# Patient Record
Sex: MALE | URBAN - METROPOLITAN AREA
[De-identification: names, ages, dates, MRNs, and addresses within clinical notes are randomized per-mention and may not be internally consistent; named-entity substitution may affect disease eponyms.]

---

## 2019-05-02 ENCOUNTER — HOSPITAL ENCOUNTER (OUTPATIENT)
Dept: RADIOLOGY | Facility: MEDICAL CENTER | Age: 49
End: 2019-05-02

## 2019-05-02 ENCOUNTER — HOSPITAL ENCOUNTER (INPATIENT)
Facility: MEDICAL CENTER | Age: 49
LOS: 4 days | DRG: 965 | End: 2019-05-06
Attending: EMERGENCY MEDICINE | Admitting: SURGERY
Payer: COMMERCIAL

## 2019-05-02 ENCOUNTER — APPOINTMENT (OUTPATIENT)
Dept: RADIOLOGY | Facility: MEDICAL CENTER | Age: 49
DRG: 965 | End: 2019-05-02
Attending: SURGERY
Payer: COMMERCIAL

## 2019-05-02 DIAGNOSIS — I62.9 INTRACRANIAL HEMORRHAGE (HCC): ICD-10-CM

## 2019-05-02 DIAGNOSIS — I61.9 INTRAPARENCHYMAL HEMORRHAGE OF BRAIN (HCC): ICD-10-CM

## 2019-05-02 DIAGNOSIS — T14.90XA TRAUMA: ICD-10-CM

## 2019-05-02 DIAGNOSIS — S32.509A CLOSED NONDISPLACED FRACTURE OF PUBIS, UNSPECIFIED LATERALITY, INITIAL ENCOUNTER (HCC): ICD-10-CM

## 2019-05-02 PROBLEM — T39.015A PLATELET DYSFUNCTION DUE TO ASPIRIN (HCC): Status: ACTIVE | Noted: 2019-05-02

## 2019-05-02 PROBLEM — D69.1 PLATELET DYSFUNCTION DUE TO ASPIRIN (HCC): Status: ACTIVE | Noted: 2019-05-02

## 2019-05-02 PROBLEM — S02.91XA SKULL FRACTURE (HCC): Status: ACTIVE | Noted: 2019-05-02

## 2019-05-02 PROBLEM — M25.572 ANKLE PAIN, LEFT: Status: ACTIVE | Noted: 2019-05-02

## 2019-05-02 PROBLEM — S32.599A PUBIC RAMUS FRACTURE (HCC): Status: ACTIVE | Noted: 2019-05-02

## 2019-05-02 PROBLEM — Z53.09 CONTRAINDICATION TO DEEP VEIN THROMBOSIS (DVT) PROPHYLAXIS: Status: ACTIVE | Noted: 2019-05-02

## 2019-05-02 PROBLEM — S01.81XA CHIN LACERATION: Status: ACTIVE | Noted: 2019-05-02

## 2019-05-02 LAB
ABO GROUP BLD: NORMAL
ALBUMIN SERPL BCP-MCNC: 3.8 G/DL (ref 3.2–4.9)
ALBUMIN/GLOB SERPL: 1.9 G/DL
ALP SERPL-CCNC: 46 U/L (ref 30–99)
ALT SERPL-CCNC: 31 U/L (ref 2–50)
ANION GAP SERPL CALC-SCNC: 14 MMOL/L (ref 0–11.9)
APTT PPP: 24.5 SEC (ref 24.7–36)
AST SERPL-CCNC: 46 U/L (ref 12–45)
BILIRUB SERPL-MCNC: 2 MG/DL (ref 0.1–1.5)
BLD GP AB SCN SERPL QL: NORMAL
BUN SERPL-MCNC: 14 MG/DL (ref 8–22)
CALCIUM SERPL-MCNC: 8.4 MG/DL (ref 8.5–10.5)
CFT BLD TEG: 4.2 MIN (ref 5–10)
CHLORIDE SERPL-SCNC: 105 MMOL/L (ref 96–112)
CLOT ANGLE BLD TEG: 65.2 DEGREES (ref 53–72)
CLOT LYSIS 30M P MA LENFR BLD TEG: 0 % (ref 0–8)
CO2 SERPL-SCNC: 19 MMOL/L (ref 20–33)
CREAT SERPL-MCNC: 0.83 MG/DL (ref 0.5–1.4)
CT.EXTRINSIC BLD ROTEM: 1.8 MIN (ref 1–3)
ERYTHROCYTE [DISTWIDTH] IN BLOOD BY AUTOMATED COUNT: 44.1 FL (ref 35.9–50)
ETHANOL BLD-MCNC: 0 G/DL
GLOBULIN SER CALC-MCNC: 2 G/DL (ref 1.9–3.5)
GLUCOSE SERPL-MCNC: 93 MG/DL (ref 65–99)
HCT VFR BLD AUTO: 43.2 % (ref 42–52)
HGB BLD-MCNC: 14.3 G/DL (ref 14–18)
INR PPP: 1.06 (ref 0.87–1.13)
MCF BLD TEG: 69.5 MM (ref 50–70)
MCH RBC QN AUTO: 30.4 PG (ref 27–33)
MCHC RBC AUTO-ENTMCNC: 33.1 G/DL (ref 33.7–35.3)
MCV RBC AUTO: 91.7 FL (ref 81.4–97.8)
PA AA BLD-ACNC: 44.3 %
PA ADP BLD-ACNC: 28.8 %
PLATELET # BLD AUTO: 223 K/UL (ref 164–446)
PMV BLD AUTO: 10.5 FL (ref 9–12.9)
POTASSIUM SERPL-SCNC: 4.4 MMOL/L (ref 3.6–5.5)
PROT SERPL-MCNC: 5.8 G/DL (ref 6–8.2)
PROTHROMBIN TIME: 13.9 SEC (ref 12–14.6)
RBC # BLD AUTO: 4.71 M/UL (ref 4.7–6.1)
RH BLD: NORMAL
SODIUM SERPL-SCNC: 138 MMOL/L (ref 135–145)
TEG ALGORITHM TGALG: ABNORMAL
WBC # BLD AUTO: 14.2 K/UL (ref 4.8–10.8)

## 2019-05-02 PROCEDURE — 80307 DRUG TEST PRSMV CHEM ANLYZR: CPT

## 2019-05-02 PROCEDURE — 70450 CT HEAD/BRAIN W/O DYE: CPT

## 2019-05-02 PROCEDURE — 86850 RBC ANTIBODY SCREEN: CPT

## 2019-05-02 PROCEDURE — 86901 BLOOD TYPING SEROLOGIC RH(D): CPT

## 2019-05-02 PROCEDURE — 85576 BLOOD PLATELET AGGREGATION: CPT | Mod: 91

## 2019-05-02 PROCEDURE — 73610 X-RAY EXAM OF ANKLE: CPT | Mod: LT

## 2019-05-02 PROCEDURE — 86900 BLOOD TYPING SEROLOGIC ABO: CPT

## 2019-05-02 PROCEDURE — 85027 COMPLETE CBC AUTOMATED: CPT

## 2019-05-02 PROCEDURE — 85384 FIBRINOGEN ACTIVITY: CPT

## 2019-05-02 PROCEDURE — 85730 THROMBOPLASTIN TIME PARTIAL: CPT

## 2019-05-02 PROCEDURE — 700101 HCHG RX REV CODE 250: Performed by: SURGERY

## 2019-05-02 PROCEDURE — 85610 PROTHROMBIN TIME: CPT

## 2019-05-02 PROCEDURE — 80053 COMPREHEN METABOLIC PANEL: CPT

## 2019-05-02 PROCEDURE — G0390 TRAUMA RESPONS W/HOSP CRITI: HCPCS

## 2019-05-02 PROCEDURE — 99291 CRITICAL CARE FIRST HOUR: CPT

## 2019-05-02 PROCEDURE — A9270 NON-COVERED ITEM OR SERVICE: HCPCS | Performed by: SURGERY

## 2019-05-02 PROCEDURE — 85347 COAGULATION TIME ACTIVATED: CPT

## 2019-05-02 PROCEDURE — 94760 N-INVAS EAR/PLS OXIMETRY 1: CPT

## 2019-05-02 PROCEDURE — 770022 HCHG ROOM/CARE - ICU (200)

## 2019-05-02 PROCEDURE — 700102 HCHG RX REV CODE 250 W/ 637 OVERRIDE(OP): Performed by: SURGERY

## 2019-05-02 PROCEDURE — 700105 HCHG RX REV CODE 258: Performed by: SURGERY

## 2019-05-02 RX ORDER — BACITRACIN ZINC AND POLYMYXIN B SULFATE 500; 1000 [USP'U]/G; [USP'U]/G
OINTMENT TOPICAL 3 TIMES DAILY
Status: DISCONTINUED | OUTPATIENT
Start: 2019-05-02 | End: 2019-05-06 | Stop reason: HOSPADM

## 2019-05-02 RX ORDER — POLYETHYLENE GLYCOL 3350 17 G/17G
1 POWDER, FOR SOLUTION ORAL 2 TIMES DAILY
Status: DISCONTINUED | OUTPATIENT
Start: 2019-05-02 | End: 2019-05-06 | Stop reason: HOSPADM

## 2019-05-02 RX ORDER — AMOXICILLIN 250 MG
1 CAPSULE ORAL NIGHTLY
Status: DISCONTINUED | OUTPATIENT
Start: 2019-05-02 | End: 2019-05-06 | Stop reason: HOSPADM

## 2019-05-02 RX ORDER — ONDANSETRON 2 MG/ML
4 INJECTION INTRAMUSCULAR; INTRAVENOUS EVERY 4 HOURS PRN
Status: DISCONTINUED | OUTPATIENT
Start: 2019-05-02 | End: 2019-05-06 | Stop reason: HOSPADM

## 2019-05-02 RX ORDER — AMOXICILLIN 250 MG
1 CAPSULE ORAL
Status: DISCONTINUED | OUTPATIENT
Start: 2019-05-02 | End: 2019-05-06 | Stop reason: HOSPADM

## 2019-05-02 RX ORDER — ENEMA 19; 7 G/133ML; G/133ML
1 ENEMA RECTAL
Status: DISCONTINUED | OUTPATIENT
Start: 2019-05-02 | End: 2019-05-06 | Stop reason: HOSPADM

## 2019-05-02 RX ORDER — ACETAMINOPHEN 500 MG
1000 TABLET ORAL EVERY 6 HOURS
Status: DISCONTINUED | OUTPATIENT
Start: 2019-05-02 | End: 2019-05-05

## 2019-05-02 RX ORDER — BISACODYL 10 MG
10 SUPPOSITORY, RECTAL RECTAL
Status: DISCONTINUED | OUTPATIENT
Start: 2019-05-02 | End: 2019-05-06 | Stop reason: HOSPADM

## 2019-05-02 RX ORDER — OXYCODONE HYDROCHLORIDE 5 MG/1
5 TABLET ORAL
Status: DISCONTINUED | OUTPATIENT
Start: 2019-05-02 | End: 2019-05-06 | Stop reason: HOSPADM

## 2019-05-02 RX ORDER — OXYCODONE HYDROCHLORIDE 10 MG/1
10 TABLET ORAL
Status: DISCONTINUED | OUTPATIENT
Start: 2019-05-02 | End: 2019-05-04

## 2019-05-02 RX ORDER — DOCUSATE SODIUM 100 MG/1
100 CAPSULE, LIQUID FILLED ORAL 2 TIMES DAILY
Status: DISCONTINUED | OUTPATIENT
Start: 2019-05-02 | End: 2019-05-06 | Stop reason: HOSPADM

## 2019-05-02 RX ORDER — SODIUM CHLORIDE 9 MG/ML
INJECTION, SOLUTION INTRAVENOUS CONTINUOUS
Status: DISCONTINUED | OUTPATIENT
Start: 2019-05-02 | End: 2019-05-03

## 2019-05-02 RX ORDER — LEVETIRACETAM 250 MG/1
500 TABLET ORAL 2 TIMES DAILY
Status: DISCONTINUED | OUTPATIENT
Start: 2019-05-02 | End: 2019-05-06 | Stop reason: HOSPADM

## 2019-05-02 RX ADMIN — ACETAMINOPHEN 1000 MG: 325 TABLET, FILM COATED ORAL at 20:27

## 2019-05-02 RX ADMIN — SODIUM CHLORIDE: 9 INJECTION, SOLUTION INTRAVENOUS at 20:02

## 2019-05-02 RX ADMIN — Medication 1 EACH: at 22:44

## 2019-05-02 RX ADMIN — LEVETIRACETAM 500 MG: 500 TABLET ORAL at 20:27

## 2019-05-02 ASSESSMENT — COGNITIVE AND FUNCTIONAL STATUS - GENERAL
WALKING IN HOSPITAL ROOM: A LITTLE
HELP NEEDED FOR BATHING: A LITTLE
CLIMB 3 TO 5 STEPS WITH RAILING: A LITTLE
MOBILITY SCORE: 20
SUGGESTED CMS G CODE MODIFIER DAILY ACTIVITY: CJ
SUGGESTED CMS G CODE MODIFIER MOBILITY: CJ
DRESSING REGULAR LOWER BODY CLOTHING: A LITTLE
MOVING FROM LYING ON BACK TO SITTING ON SIDE OF FLAT BED: A LITTLE
STANDING UP FROM CHAIR USING ARMS: A LITTLE
DAILY ACTIVITIY SCORE: 22

## 2019-05-02 ASSESSMENT — COPD QUESTIONNAIRES
COPD SCREENING SCORE: 2
HAVE YOU SMOKED AT LEAST 100 CIGARETTES IN YOUR ENTIRE LIFE: YES
COPD SCREENING SCORE: 3
DO YOU EVER COUGH UP ANY MUCUS OR PHLEGM?: NO/ONLY WITH OCCASIONAL COLDS OR INFECTIONS
IN THE PAST 12 MONTHS DO YOU DO LESS THAN YOU USED TO BECAUSE OF YOUR BREATHING PROBLEMS: DISAGREE/UNSURE
HAVE YOU SMOKED AT LEAST 100 CIGARETTES IN YOUR ENTIRE LIFE: YES
DURING THE PAST 4 WEEKS HOW MUCH DID YOU FEEL SHORT OF BREATH: SOME OF THE TIME
DO YOU EVER COUGH UP ANY MUCUS OR PHLEGM?: NO/ONLY WITH OCCASIONAL COLDS OR INFECTIONS
DURING THE PAST 4 WEEKS HOW MUCH DID YOU FEEL SHORT OF BREATH: NONE/LITTLE OF THE TIME

## 2019-05-02 ASSESSMENT — LIFESTYLE VARIABLES
EVER FELT BAD OR GUILTY ABOUT YOUR DRINKING: NO
TOTAL SCORE: 1
HAVE PEOPLE ANNOYED YOU BY CRITICIZING YOUR DRINKING: NO
CONSUMPTION TOTAL: POSITIVE
HAVE YOU EVER FELT YOU SHOULD CUT DOWN ON YOUR DRINKING: YES
EVER HAD A DRINK FIRST THING IN THE MORNING TO STEADY YOUR NERVES TO GET RID OF A HANGOVER: NO
TOTAL SCORE: 1
AVERAGE NUMBER OF DAYS PER WEEK YOU HAVE A DRINK CONTAINING ALCOHOL: 3
ALCOHOL_USE: YES
HOW MANY TIMES IN THE PAST YEAR HAVE YOU HAD 5 OR MORE DRINKS IN A DAY: 3
ON A TYPICAL DAY WHEN YOU DRINK ALCOHOL HOW MANY DRINKS DO YOU HAVE: 2
TOTAL SCORE: 1
EVER_SMOKED: YES
DO YOU DRINK ALCOHOL: NO
EVER_SMOKED: YES

## 2019-05-02 ASSESSMENT — PATIENT HEALTH QUESTIONNAIRE - PHQ9
SUM OF ALL RESPONSES TO PHQ9 QUESTIONS 1 AND 2: 0
1. LITTLE INTEREST OR PLEASURE IN DOING THINGS: NOT AT ALL
2. FEELING DOWN, DEPRESSED, IRRITABLE, OR HOPELESS: NOT AT ALL

## 2019-05-03 LAB
ABO + RH BLD: NORMAL
ALBUMIN SERPL BCP-MCNC: 3.1 G/DL (ref 3.2–4.9)
ALBUMIN/GLOB SERPL: 1.8 G/DL
ALP SERPL-CCNC: 37 U/L (ref 30–99)
ALT SERPL-CCNC: 28 U/L (ref 2–50)
ANION GAP SERPL CALC-SCNC: 5 MMOL/L (ref 0–11.9)
AST SERPL-CCNC: 46 U/L (ref 12–45)
BASOPHILS # BLD AUTO: 0.5 % (ref 0–1.8)
BASOPHILS # BLD: 0.05 K/UL (ref 0–0.12)
BILIRUB SERPL-MCNC: 2.8 MG/DL (ref 0.1–1.5)
BUN SERPL-MCNC: 11 MG/DL (ref 8–22)
CALCIUM SERPL-MCNC: 7.9 MG/DL (ref 8.5–10.5)
CHLORIDE SERPL-SCNC: 109 MMOL/L (ref 96–112)
CO2 SERPL-SCNC: 22 MMOL/L (ref 20–33)
CREAT SERPL-MCNC: 0.8 MG/DL (ref 0.5–1.4)
EOSINOPHIL # BLD AUTO: 0.03 K/UL (ref 0–0.51)
EOSINOPHIL NFR BLD: 0.3 % (ref 0–6.9)
ERYTHROCYTE [DISTWIDTH] IN BLOOD BY AUTOMATED COUNT: 45 FL (ref 35.9–50)
GLOBULIN SER CALC-MCNC: 1.7 G/DL (ref 1.9–3.5)
GLUCOSE SERPL-MCNC: 120 MG/DL (ref 65–99)
HCT VFR BLD AUTO: 37.2 % (ref 42–52)
HGB BLD-MCNC: 12.5 G/DL (ref 14–18)
IMM GRANULOCYTES # BLD AUTO: 0.04 K/UL (ref 0–0.11)
IMM GRANULOCYTES NFR BLD AUTO: 0.4 % (ref 0–0.9)
LYMPHOCYTES # BLD AUTO: 1.52 K/UL (ref 1–4.8)
LYMPHOCYTES NFR BLD: 16.3 % (ref 22–41)
MCH RBC QN AUTO: 31.2 PG (ref 27–33)
MCHC RBC AUTO-ENTMCNC: 33.6 G/DL (ref 33.7–35.3)
MCV RBC AUTO: 92.8 FL (ref 81.4–97.8)
MONOCYTES # BLD AUTO: 1 K/UL (ref 0–0.85)
MONOCYTES NFR BLD AUTO: 10.8 % (ref 0–13.4)
NEUTROPHILS # BLD AUTO: 6.66 K/UL (ref 1.82–7.42)
NEUTROPHILS NFR BLD: 71.7 % (ref 44–72)
NRBC # BLD AUTO: 0 K/UL
NRBC BLD-RTO: 0 /100 WBC
PLATELET # BLD AUTO: 196 K/UL (ref 164–446)
PMV BLD AUTO: 10.7 FL (ref 9–12.9)
POTASSIUM SERPL-SCNC: 3.8 MMOL/L (ref 3.6–5.5)
PROT SERPL-MCNC: 4.8 G/DL (ref 6–8.2)
RBC # BLD AUTO: 4.01 M/UL (ref 4.7–6.1)
SODIUM SERPL-SCNC: 136 MMOL/L (ref 135–145)
WBC # BLD AUTO: 9.3 K/UL (ref 4.8–10.8)

## 2019-05-03 PROCEDURE — 92523 SPEECH SOUND LANG COMPREHEN: CPT

## 2019-05-03 PROCEDURE — 700112 HCHG RX REV CODE 229: Performed by: SURGERY

## 2019-05-03 PROCEDURE — 700105 HCHG RX REV CODE 258: Performed by: NURSE PRACTITIONER

## 2019-05-03 PROCEDURE — A9270 NON-COVERED ITEM OR SERVICE: HCPCS | Performed by: SURGERY

## 2019-05-03 PROCEDURE — 85025 COMPLETE CBC W/AUTO DIFF WBC: CPT

## 2019-05-03 PROCEDURE — 80053 COMPREHEN METABOLIC PANEL: CPT

## 2019-05-03 PROCEDURE — 99233 SBSQ HOSP IP/OBS HIGH 50: CPT | Performed by: SURGERY

## 2019-05-03 PROCEDURE — 51798 US URINE CAPACITY MEASURE: CPT

## 2019-05-03 PROCEDURE — 700101 HCHG RX REV CODE 250: Performed by: SURGERY

## 2019-05-03 PROCEDURE — 700102 HCHG RX REV CODE 250 W/ 637 OVERRIDE(OP): Performed by: SURGERY

## 2019-05-03 PROCEDURE — 700105 HCHG RX REV CODE 258: Performed by: SURGERY

## 2019-05-03 PROCEDURE — 770001 HCHG ROOM/CARE - MED/SURG/GYN PRIV*

## 2019-05-03 RX ORDER — SODIUM CHLORIDE 9 MG/ML
INJECTION, SOLUTION INTRAVENOUS CONTINUOUS
Status: DISCONTINUED | OUTPATIENT
Start: 2019-05-03 | End: 2019-05-04

## 2019-05-03 RX ADMIN — SODIUM CHLORIDE: 9 INJECTION, SOLUTION INTRAVENOUS at 21:03

## 2019-05-03 RX ADMIN — Medication 1 EACH: at 05:41

## 2019-05-03 RX ADMIN — LEVETIRACETAM 500 MG: 500 TABLET ORAL at 17:01

## 2019-05-03 RX ADMIN — Medication 1 EACH: at 12:23

## 2019-05-03 RX ADMIN — ACETAMINOPHEN 1000 MG: 325 TABLET, FILM COATED ORAL at 02:31

## 2019-05-03 RX ADMIN — DOCUSATE SODIUM 100 MG: 100 CAPSULE, LIQUID FILLED ORAL at 17:01

## 2019-05-03 RX ADMIN — OXYCODONE HYDROCHLORIDE 10 MG: 10 TABLET ORAL at 05:37

## 2019-05-03 RX ADMIN — OXYCODONE HYDROCHLORIDE 10 MG: 10 TABLET ORAL at 02:31

## 2019-05-03 RX ADMIN — ACETAMINOPHEN 1000 MG: 325 TABLET, FILM COATED ORAL at 12:23

## 2019-05-03 RX ADMIN — Medication 1 EACH: at 17:01

## 2019-05-03 RX ADMIN — DOCUSATE SODIUM 100 MG: 100 CAPSULE, LIQUID FILLED ORAL at 05:37

## 2019-05-03 RX ADMIN — SENNOSIDES, DOCUSATE SODIUM 1 TABLET: 50; 8.6 TABLET, FILM COATED ORAL at 21:03

## 2019-05-03 RX ADMIN — LEVETIRACETAM 500 MG: 500 TABLET ORAL at 05:37

## 2019-05-03 RX ADMIN — ACETAMINOPHEN 1000 MG: 325 TABLET, FILM COATED ORAL at 06:16

## 2019-05-03 RX ADMIN — ACETAMINOPHEN 1000 MG: 325 TABLET, FILM COATED ORAL at 17:02

## 2019-05-03 RX ADMIN — SODIUM CHLORIDE: 9 INJECTION, SOLUTION INTRAVENOUS at 04:27

## 2019-05-03 ASSESSMENT — ENCOUNTER SYMPTOMS
VOMITING: 0
NAUSEA: 0
BLURRED VISION: 0
NECK PAIN: 0
SHORTNESS OF BREATH: 0
FOCAL WEAKNESS: 0
SENSORY CHANGE: 0
FEVER: 0
ABDOMINAL PAIN: 0
DIZZINESS: 0
CHILLS: 0
DOUBLE VISION: 0
MYALGIAS: 0
BACK PAIN: 0

## 2019-05-03 NOTE — PROGRESS NOTES
Dr. valenzuela paged regarding clarification for CT Head in the a.m. And okay to transfer out of unit.

## 2019-05-03 NOTE — CONSULTS
5/2/2019    Reason for consultation: Right pelvic ring injury    Consultation on Td Galarza at the request of Dr. Hurd for a right pelvic ring injury.  The patient is a 48 y.o. male who presents with a right pelvic ring injury due to fall from ladder today.  They were evaluated in the ER at [facility], and transferred to [facility] for head trauma, and Orthopedics was consulted. Patient denies numbness, paresthesias.  Unknown loss of consciousness, fall was unwitnessed.  He complains mostly of head/facial pain, denies much pain in his hip/pelvis except when turning.    No past medical history on file.    No past surgical history on file.    Medications  No current facility-administered medications on file prior to encounter.      No current outpatient prescriptions on file prior to encounter.       Allergies  Patient has no known allergies.    ROS  Per HPI. All other systems were reviewed and found to be negative    No family history on file.    Social History     Social History   • Marital status: N/A     Spouse name: N/A   • Number of children: N/A   • Years of education: N/A     Social History Main Topics   • Smoking status: Not on file   • Smokeless tobacco: Not on file   • Alcohol use Not on file   • Drug use: Unknown   • Sexual activity: Not on file     Other Topics Concern   • Not on file     Social History Narrative   • No narrative on file       Physical Exam  Vitals  /78   Pulse 89   Temp 37.3 °C (99.1 °F) (Temporal)   Resp 18   Ht 1.829 m (6')   Wt 81.6 kg (180 lb)   SpO2 96%   General: Well Developed, Well Nourished, no acute distress  Psychiatric: Alert and oriented x3, appropriate responses to questions, pleasant mood and affect.  HEENT: Hematoma about right eye, right sided facial swelling  Eyes: Anicteric, PERRL on left  Neck: Midline trachea, no pain with ROM  Chest: Symmetric expansion of the chest wall, non-tender to palpation, no distress.  Heart: RRR, palpable peripheral  pulses  Abdomen: Soft, NT, ND  Skin: Intact, no open wounds  Extremities: No pain with palpation of lower extremities or uppers, no deformities, no pain with log roll or hip ROM.  Neuro: Intact light touch sensation in feet, intact motors TA/GS/EHL/P bilaterally, no pain with palpation of upper extremities  Vascular: 2+ DP and radial pulses bilaterally, Capillary refill <2 seconds    Radiographs:  DX-ANKLE 3+ VIEWS LEFT   Final Result      1.  There is no displaced left ankle fracture.      OUTSIDE IMAGES-DX CHEST   Final Result      OUTSIDE IMAGES-CT HEAD   Final Result      OUTSIDE IMAGES-CT FACE   Final Result      OUTSIDE IMAGES-CT CERVICAL SPINE   Final Result      OUTSIDE IMAGES-CT CHEST/ABDOMEN/PELVIS   Final Result      OUTSIDE IMAGES-DX PELVIS   Final Result      CT-HEAD W/O    (Results Pending)       Laboratory Values  Recent Labs      05/02/19   1821   WBC  14.2*   RBC  4.71   HEMOGLOBIN  14.3   HEMATOCRIT  43.2   MCV  91.7   MCH  30.4   MCHC  33.1*   RDW  44.1   PLATELETCT  223   MPV  10.5     Recent Labs      05/02/19   1821   SODIUM  138   POTASSIUM  4.4   CHLORIDE  105   CO2  19*   GLUCOSE  93   BUN  14     Recent Labs      05/02/19   1821   APTT  24.5*   INR  1.06         Impression:    #1 Right LC 1 pelvic ring injury    Plan:    Non-operative management.  Patient may bear weight as tolerated, and should work with PT.  DVT prophylaxis is appropriate, and can be administered per trauma recs.  Follow up at [facility] or local orthopedic surgeon in 2 weeks.

## 2019-05-03 NOTE — PROGRESS NOTES
Trauma Progress Note 5/2/2019 10:10 PM    Briefly, this is a 48 y.o. male who was injured in a presumed, but unwitnessed fall from a ladder. He was seen at [facility] initially where he was found to have scattered frontal intraparenchymal hemorrhages and nondisplaced right superior ramus and acetabular fracture.            /78   Pulse 84   Temp 37.9 °C (100.3 °F) (Temporal)   Resp 15   Ht 1.829 m (6')   Wt 78.5 kg (173 lb 1 oz)   SpO2 97%   BMI 23.47 kg/m²     Hemoglobin: 14.3 g/dL  Hematocrit: 43.2 %    Elevated Anion gap: 14              Recent Labs      05/02/19   1821   APTT  24.5*   INR  1.06      Recent Labs      05/02/19   1821   REACTMIN  4.2*   CLOTKINET  1.8   CLOTANGL  65.2   MAXCLOTS  69.5   OHA00YLD  0.0   PRCINADP  28.8   PRCINAA  44.3         Urine Output: 850ml    Assessment: Remains somnolent but is orientated when awoken. Repeat head CT read as stable compared to outside Ct.       Additional plans: Cognitive evaluation in am. Q 2 hour neuro checks. Repeat labs in morning.

## 2019-05-03 NOTE — DISCHARGE PLANNING
Trauma Response    Referral: Trauma Yellow Transfer Response    Intervention: SW responded to trauma Yellow Transfer.  Pt was BIB [facility] after fall.  Pt was alert, oriented, talking upon arrival.  Pts name is Td Galarza (: 1970).  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  SW spoke with Pt he has his cell phone and he is keeping in touch with his family in the [place].    Plan: SW will monitor

## 2019-05-03 NOTE — CARE PLAN
Problem: Infection  Goal: Will remain free from infection    Intervention: Implement standard precautions and perform hand washing before and after patient contact  Standard precautions and hand hygiene implemented before and after each patient interaction       Problem: Venous Thromboembolism (VTW)/Deep Vein Thrombosis (DVT) Prevention:  Goal: Patient will participate in Venous Thrombosis (VTE)/Deep Vein Thrombosis (DVT)Prevention Measures    Intervention: Ensure patient wears graduated elastic stockings (HADLEY hose) and/or SCDs, if ordered, when in bed or chair (Remove at least once per shift for skin check)  SCDs will remain in place for extent of time patient remains in bed except when performing 2 RN skin check, bed bath, and/or ambulation

## 2019-05-03 NOTE — ASSESSMENT & PLAN NOTE
Referring facility CT head shows small multifocal areas of hemorrhage with a small cortical hemorrhage right frontal vertex and a 1cm deep white matter focus of hemorrhage int he right temporal region.   Repeat interval CT head with focal areas of hemorrhagic contusion in the right temporal and frontal lobes as described above, stable compared with the recent prior outside scan. Tiny acute extra-axial hemorrhage overlying the right frontal lobe, probably subdural, likely new.  Non-operative management.  Post traumatic pharmacologic seizure prophylaxis for 1 week.  Speech Language Pathology cognitive evaluation completed.  Renzo Gomez MD. Neurosurgery.

## 2019-05-03 NOTE — PROGRESS NOTES
2 RN skin check with ES Mcfadden    - sacrum intact, mepilex for preventative measures  - Right eye edema/ecchymosis  - Chin laceration, sutured PTA, open to air  - Left AC bruise  - Right posterior hip bruising  - Left ankle bruise  - Scattered abrasions throughout body

## 2019-05-03 NOTE — CARE PLAN
Problem: Safety  Goal: Will remain free from falls    Intervention: Implement fall precautions  Patient reminded to call RN before getting out of bed.  Call light within reach, bed in lowest position, treaded socks on patient and bed alarm activated.       Problem: Skin Integrity  Goal: Risk for impaired skin integrity will decrease    Intervention: Assess and monitor skin integrity, appearance and/or temperature  Complete skin assessment done with another RN.  See note for details.  Patient self turns, pillows in use for support.

## 2019-05-03 NOTE — ED NOTES
Med Rec Updated and Complete per Pt at bedside  Allergies Reviewed  No PO ABX last 30 days.    Pt denies RX medication at this time.    Pt reports he takes ASA 81 every now and then because it is recommended at his age.

## 2019-05-03 NOTE — DIETARY
Nutrition services: Day 1 of admit. 47 yo male admitted following a fall from ladder.  Consult for poor po intake and weight loss PTA.    Evaluation:  1. Pt states he has had about 5 kg weight loss over 1 months secondary to working hard with decreased nutritional intake.  2. He states he has no problems with his appetite and is eating well.   3. Diet advanced to regular today.     Malnutrition risk: moderate malnutrition with 5% weight loss over 1 month. Good appetite. Pt not concerned.     Recommendations/Plan:  1. encourage intake of meals.  2. document intake of all meals and supplements as % taken in ADL's to provide interdisciplinary communication across all shifts   3. monitor daily weights  4. Nutrition rep will continue to see patient for ongoing meal and snack preferences.

## 2019-05-03 NOTE — PROGRESS NOTES
Pt arrived to the unit on monitor at 1942 w/ ACLS RN and CCT. AO4, HANEY, no complaints of pain, all belongings at bedside. Pt oriented to the unit and call light. All questions and concerns addressed. No other questions at this time. Pt resting comfortably in bed.

## 2019-05-03 NOTE — ASSESSMENT & PLAN NOTE
Bilateral skull fractures in the lamina Propecia with overlying facial swelling and contusion.  Non-operative management.  Renzo Gomez MD. Neurosurgery.

## 2019-05-03 NOTE — ASSESSMENT & PLAN NOTE
Systemic anticoagulation contraindicated secondary to elevated bleeding risk.  Surveillance venous duplex scanning negative for DVT.

## 2019-05-03 NOTE — THERAPY
Speech Language Therapy Evaluation completed to address communication  Functional Status:  Cognitive-linguistic evaluation completed on this date.  Patient pleasant but with periods of lethargy. Patient originally from [place] [facility] but has lived and worked in the [place] and is fluent in English.  He completed the Cognistat in its entirety as well as other informal measures.  The Cognistat is a screening test that assesses the areas of language, construction, memory, calculations, and reasoning. He presented with a moderate-severe deficit in memory and a mild deficit in calculations.  All other domains assessed on the Cognistat were within average range.  However, the patient demonstrated mildly impaired higher level problem solving, medication management, reduced insight into deficits, and safety awareness.  While the patient achieved an average score of 8 in attention on the Cognistat, he required cues to attend to tasks as the session progressed and to maintain alertness.  The patient followed multi-step verbal directives but demonstrated delayed auditory processing.  He followed simple written directives, though, higher level written directives were less accurate which may be a language difference. The patient was noted to jump between sentences putting responses on the sentences below.  Writing was intact at the sentence level and was marked with missing articles, though, typical of a non-native English speaker.  He demonstrated mild word finding deficits and intermittent word substitutions which did not appear to be related to a language difference.  In the clock drawing task, the clock was disorganized with minimally displaced numbers and incorrect hand placement, despite cues.   Recommendations:  At this time, recommend close supervision upon discharge.  Given deficits, he does not appear to be safe to discharge alone and drive himself back to [facility].  The patient would benefit from outpatient  "SLP services to address stated deficits.  SLP following during the acute care.    Plan of Care: Will benefit from Speech Therapy 5 times per week  Post-Acute Therapy: Recommend outpatient or home health transitional care services for continued speech therapy services      See \"Rehab Therapy-Acute\" Patient Summary Report for complete documentation.                    "

## 2019-05-03 NOTE — PROGRESS NOTES
Dr Hurd at bedside to evaluate patient at approximately 2150. New orders received and implemented.

## 2019-05-03 NOTE — ASSESSMENT & PLAN NOTE
Tenderness over the left medial malleolus.  Diagnostic imaging on admission negative for fracture or dislocation.  Mobilize as tolerated.

## 2019-05-03 NOTE — H&P
TRAUMA HISTORY AND PHYSICAL    CHIEF COMPLAINT: Intracranial hemorrhage. Pelvic fracture.     HISTORY OF PRESENT ILLNESS: The patient is a 48 year-old White man who was injured in a presumed, but unwitnessed fall from a ladder.    TRIAGE CATEGORY: The patient was triaged as a Trauma Yellow Transfer activation. The patient was initially evaluated at [facility] in [place], [place] where CT imaging demonstrated Scattered frontal intraparenchymal hemorrhages and a nondisplaced right superior ramus and acetabular fracture. The patient was transported to [facility] in [place], [place] for a definitive neurotrauma evaluation. An expeditious primary and secondary survey with required adjuncts was conducted. See Trauma Narrator for full details.    PAST MEDICAL HISTORY:  has no past medical history on file.     PAST SURGICAL HISTORY:  has no past surgical history on file.    ALLERGIES: No Known Allergies    CURRENT MEDICATIONS:    Home Medications     Reviewed by Nacho Bowie (Pharmacy Tech) on 05/02/19 at 1842  Med List Status: Complete   Medication Last Dose Status   aspirin EC (ECOTRIN) 81 MG Tablet Delayed Response 5/1/2019 Active              FAMILY HISTORY: family history is not on file.    SOCIAL HISTORY:  Unknown.    REVIEW OF SYSTEMS: Comprehensive review of systems is not able to be elicited from the patient secondary to the acuity of the clinical situation    PHYSICAL EXAMINATION:     CONSTITUTIONAL:     Vital Signs: /78   Pulse 98   Temp 37.3 °C (99.1 °F) (Temporal)   Resp 18   Ht 1.829 m (6')   Wt 81.6 kg (180 lb)   SpO2 97%    General Appearance: appears stated age, is in mild distress.  HEENT:    Right facial and jaw swelling.  Small abrasion under the undersurface of the anterior mandible. The pupils are equal, round, and reactive to light bilaterally. The extraocular muscles are intact bilaterally. The ear canals and tympanic membranes are normal. The nares and  oropharynx are clear. The midface and jaw are stable. No malocclusion is evident.  NECK:    The cervical spine is supple and non tender. Normal range of motion.  RESPIRATORY:   Inspection: Unlabored respirations, no intercostal retractions, paradoxical motion, or accessory muscle use.   Palpation:  The chest is nontender. The clavicles are non deformed bilaterally.   Auscultation: clear to auscultation.  CARDIOVASCULAR:   Auscultation: regular rate and rhythm.   Peripheral Pulses: Normal.   ABDOMEN:   Abdomen is soft, nontender, without organomegaly or masses.  GENITOURINARY:   (MALE): normal male external genitalia.  MUSCULOSKELETAL:   The pelvis is tender anteriorly on the right. No significant angulation, deformity, or soft tissue injury involving the upper and lower extremities.  Tenderness over the left medial malleolus.  BACK:   The thoracolumbar spine was examined utilizing spinal motion restriction. Examination is remarkable for no significant tenderness, swelling, or deformity in the thoracolumbar region.  SKIN:    The skin is warm, dry and well purfused.  NEUROLOGIC:    [place] Coma Scale (GCS) 15. Neurologic examination revealed no focal deficits noted, cranial nerves II through XII intact, muscle tone normal, muscle strength normal, sensation is grossly normal, mildly confused and intermittently repetitive.  PSYCHIATRIC:   The patient does not appear depressed or anxious.    LABORATORY VALUES:   Recent Labs      05/02/19   1821   WBC  14.2*   RBC  4.71   HEMOGLOBIN  14.3   HEMATOCRIT  43.2   MCV  91.7   MCH  30.4   MCHC  33.1*   RDW  44.1   PLATELETCT  223   MPV  10.5     Recent Labs      05/02/19   1821   SODIUM  138   POTASSIUM  4.4   CHLORIDE  105   CO2  19*   GLUCOSE  93   BUN  14   CREATININE  0.83   CALCIUM  8.4*     Recent Labs      05/02/19   1821   ASTSGOT  46*   ALTSGPT  31   TBILIRUBIN  2.0*   ALKPHOSPHAT  46   GLOBULIN  2.0   INR  1.06     Recent Labs      05/02/19   1821   APTT  24.5*   INR   1.06        IMAGING:   DX-ANKLE 3+ VIEWS LEFT   Final Result      1.  There is no displaced left ankle fracture.      OUTSIDE IMAGES-DX CHEST   Final Result      OUTSIDE IMAGES-CT HEAD   Final Result      OUTSIDE IMAGES-CT FACE   Final Result      OUTSIDE IMAGES-CT CERVICAL SPINE   Final Result      OUTSIDE IMAGES-CT CHEST/ABDOMEN/PELVIS   Final Result      OUTSIDE IMAGES-DX PELVIS   Final Result          ACTIVE PROBLEMS:     Intracranial hemorrhage (HCC)  Referring facility CT head shows small multifocal areas of hemorrhage with a small cortical hemorrhage right frontal vertex and a 1cm deep white matter focus of hemorrhage int he right temporal region.   Repeat interval CT imaging of the brain.  Non-operative management.  Post traumatic pharmacologic seizure prophylaxis for 1 week.  Speech Language Pathology cognitive evaluation.  Renzo Gomez MD. Neurosurgery.    Platelet dysfunction due to aspirin ([facility])  Daily aspirin use.   TEG with platelet mapping pending.    Skull fracture ([facility])  Bilateral skull fractures in the lamina Propecia with overlying facial swelling and contusion.  Non-operative management.  Renzo Gomez MD. Neurosurgery.    Pubic ramus fracture ([facility])  Referring facility imaging notes a nondisplaced fracture at the junction of the right pubic ramus with the anterior column of the acetabulum and a nondisplaced fracture in the right sacral wing.  Definitive plan pending.  Weight bearing status - Nonweightbearing BLE.  Lul Javier MD. Orthopedic Surgery.    Trauma  Fall from [employer] ladder night prior to admission. Amnesic to event.  Trauma Yellow Transfer Activation from [facility].  Danis Hurd MD. Trauma Surgery.    Contraindication to deep vein thrombosis (DVT) prophylaxis  Systemic anticoagulation contraindicated secondary to elevated bleeding risk.  5/2 Surveillance venous duplex scanning ordered for 5/4.    Chin laceration  Sutured repair of right chin laceration at the  referring facility.    Ankle pain, left  Tenderness over the left medial malleolus.  Diagnostic imaging on admission negative for fracture or dislocation.  Mobilize as tolerated.      ASSESSMENT AND PLAN:  Multisystem trauma.  Bifrontal intracranial contusions.  Pelvic fracture.  ICU admission for serial neurologic checks.  Repeat imaging.  Neurosurgical consultation.  Orthopedic surgery consultation.  Tertiary survey.    DISPOSITION: Trauma ICU.    CRITICAL CARE TIME: 34 minutes excluding procedures.  ____________________________________   Danis Hurd M.D.    DD: 5/2/2019  6:18 PM

## 2019-05-03 NOTE — PROGRESS NOTES
Trauma / Surgical Daily Progress Note    Date of Service  5/3/2019    Chief Complaint  48 y.o. male admitted 5/2/2019 with Trauma    Interval Events  New admit to ICU  Neurosurgical and orthopedic recommendations reviewed  RAP / SBIRT completed    - Advance diet  - Therapy evaluations pending  - Clinically stable to transfer to neurosurgery [facility] at this time, Dr. Hurd updated    Review of Systems  Review of Systems   Constitutional: Negative for chills and fever.   Eyes: Negative for blurred vision and double vision.   Respiratory: Negative for shortness of breath.    Cardiovascular: Negative for chest pain.   Gastrointestinal: Negative for abdominal pain, nausea and vomiting.   Genitourinary:        Voiding   Musculoskeletal: Positive for joint pain (mild pelivc pain). Negative for back pain, myalgias and neck pain.   Neurological: Negative for dizziness, sensory change and focal weakness.        Vital Signs  Temp:  [36.7 °C (98.1 °F)-37.9 °C (100.3 °F)] 36.8 °C (98.3 °F)  Pulse:  [68-98] 70  Resp:  [13-31] 18  BP: (134-141)/(78-84) 134/78  SpO2:  [92 %-99 %] 92 %    Physical Exam  Physical Exam   Constitutional: He is oriented to person, place, and time. He appears well-developed. No distress.   Eyes: Right conjunctiva has a hemorrhage (mild).   Right periorbital ecchymosis and edema   Neck: No JVD present. No tracheal deviation present.   Cardiovascular: Normal rate and intact distal pulses.    Pulmonary/Chest: Effort normal. No respiratory distress. He exhibits no tenderness.   Abdominal: Soft. He exhibits no distension. There is no tenderness. There is no guarding.   Musculoskeletal:   Moves all extremities   Neurological: He is alert and oriented to person, place, and time.   Skin: Skin is warm and dry.   Nursing note and vitals reviewed.      Laboratory  Recent Results (from the past 24 hour(s))   DIAGNOSTIC ALCOHOL    Collection Time: 05/02/19  6:21 PM   Result Value Ref Range    Diagnostic Alcohol 0.00 0.00  g/dL   CBC WITHOUT DIFFERENTIAL    Collection Time: 05/02/19  6:21 PM   Result Value Ref Range    WBC 14.2 (H) 4.8 - 10.8 K/uL    RBC 4.71 4.70 - 6.10 M/uL    Hemoglobin 14.3 14.0 - 18.0 g/dL    Hematocrit 43.2 42.0 - 52.0 %    MCV 91.7 81.4 - 97.8 fL    MCH 30.4 27.0 - 33.0 pg    MCHC 33.1 (L) 33.7 - 35.3 g/dL    RDW 44.1 35.9 - 50.0 fL    Platelet Count 223 164 - 446 K/uL    MPV 10.5 9.0 - 12.9 fL   Comp Metabolic Panel    Collection Time: 05/02/19  6:21 PM   Result Value Ref Range    Sodium 138 135 - 145 mmol/L    Potassium 4.4 3.6 - 5.5 mmol/L    Chloride 105 96 - 112 mmol/L    Co2 19 (L) 20 - 33 mmol/L    Anion Gap 14.0 (H) 0.0 - 11.9    Glucose 93 65 - 99 mg/dL    Bun 14 8 - 22 mg/dL    Creatinine 0.83 0.50 - 1.40 mg/dL    Calcium 8.4 (L) 8.5 - 10.5 mg/dL    AST(SGOT) 46 (H) 12 - 45 U/L    ALT(SGPT) 31 2 - 50 U/L    Alkaline Phosphatase 46 30 - 99 U/L    Total Bilirubin 2.0 (H) 0.1 - 1.5 mg/dL    Albumin 3.8 3.2 - 4.9 g/dL    Total Protein 5.8 (L) 6.0 - 8.2 g/dL    Globulin 2.0 1.9 - 3.5 g/dL    A-G Ratio 1.9 g/dL   Prothrombin Time    Collection Time: 05/02/19  6:21 PM   Result Value Ref Range    PT 13.9 12.0 - 14.6 sec    INR 1.06 0.87 - 1.13   APTT    Collection Time: 05/02/19  6:21 PM   Result Value Ref Range    APTT 24.5 (L) 24.7 - 36.0 sec   PLATELET MAPPING WITH BASIC TEG    Collection Time: 05/02/19  6:21 PM   Result Value Ref Range    Reaction Time Initial-R 4.2 (L) 5.0 - 10.0 min    Clot Kinetics-K 1.8 1.0 - 3.0 min    Clot Angle-Angle 65.2 53.0 - 72.0 degrees    Maximum Clot Strength-MA 69.5 50.0 - 70.0 mm    Lysis 30 minutes-LY30 0.0 0.0 - 8.0 %    % Inhibition ADP 28.8 %    % Inhibition AA 44.3 %    TEG Algorithm Link Algorithm    COD - Adult (Type and Screen)    Collection Time: 05/02/19  6:21 PM   Result Value Ref Range    ABO Grouping Only B     Rh Grouping Only POS     Antibody Screen-Cod NEG    ESTIMATED GFR    Collection Time: 05/02/19  6:21 PM   Result Value Ref Range    GFR If   American >60 >60 mL/min/1.73 m 2    GFR If Non African American >60 >60 mL/min/1.73 m 2   CBC with Differential: Tomorrow AM    Collection Time: 05/03/19  4:30 AM   Result Value Ref Range    WBC 9.3 4.8 - 10.8 K/uL    RBC 4.01 (L) 4.70 - 6.10 M/uL    Hemoglobin 12.5 (L) 14.0 - 18.0 g/dL    Hematocrit 37.2 (L) 42.0 - 52.0 %    MCV 92.8 81.4 - 97.8 fL    MCH 31.2 27.0 - 33.0 pg    MCHC 33.6 (L) 33.7 - 35.3 g/dL    RDW 45.0 35.9 - 50.0 fL    Platelet Count 196 164 - 446 K/uL    MPV 10.7 9.0 - 12.9 fL    Neutrophils-Polys 71.70 44.00 - 72.00 %    Lymphocytes 16.30 (L) 22.00 - 41.00 %    Monocytes 10.80 0.00 - 13.40 %    Eosinophils 0.30 0.00 - 6.90 %    Basophils 0.50 0.00 - 1.80 %    Immature Granulocytes 0.40 0.00 - 0.90 %    Nucleated RBC 0.00 /100 WBC    Neutrophils (Absolute) 6.66 1.82 - 7.42 K/uL    Lymphs (Absolute) 1.52 1.00 - 4.80 K/uL    Monos (Absolute) 1.00 (H) 0.00 - 0.85 K/uL    Eos (Absolute) 0.03 0.00 - 0.51 K/uL    Baso (Absolute) 0.05 0.00 - 0.12 K/uL    Immature Granulocytes (abs) 0.04 0.00 - 0.11 K/uL    NRBC (Absolute) 0.00 K/uL   Comp Metabolic Panel (CMP): Tomorrow AM    Collection Time: 05/03/19  4:30 AM   Result Value Ref Range    Sodium 136 135 - 145 mmol/L    Potassium 3.8 3.6 - 5.5 mmol/L    Chloride 109 96 - 112 mmol/L    Co2 22 20 - 33 mmol/L    Anion Gap 5.0 0.0 - 11.9    Glucose 120 (H) 65 - 99 mg/dL    Bun 11 8 - 22 mg/dL    Creatinine 0.80 0.50 - 1.40 mg/dL    Calcium 7.9 (L) 8.5 - 10.5 mg/dL    AST(SGOT) 46 (H) 12 - 45 U/L    ALT(SGPT) 28 2 - 50 U/L    Alkaline Phosphatase 37 30 - 99 U/L    Total Bilirubin 2.8 (H) 0.1 - 1.5 mg/dL    Albumin 3.1 (L) 3.2 - 4.9 g/dL    Total Protein 4.8 (L) 6.0 - 8.2 g/dL    Globulin 1.7 (L) 1.9 - 3.5 g/dL    A-G Ratio 1.8 g/dL   ESTIMATED GFR    Collection Time: 05/03/19  4:30 AM   Result Value Ref Range    GFR If African American >60 >60 mL/min/1.73 m 2    GFR If Non African American >60 >60 mL/min/1.73 m 2       Fluids    Intake/Output Summary (Last 24  hours) at 05/03/19 0959  Last data filed at 05/03/19 0600   Gross per 24 hour   Intake          2125.83 ml   Output             1200 ml   Net           925.83 ml       Core Measures & Quality Metrics  Labs reviewed, Medications reviewed and Radiology images reviewed  Fontaine catheter: No Fontaine      DVT Prophylaxis: Contraindicated - High bleeding risk  DVT prophylaxis - mechanical: SCDs          Total Score: 9    ETOH Screening  CAGE Score: 1  Assessment complete date: 5/3/2019        Assessment/Plan  Pubic ramus fracture (HCC)- (present on admission)   Assessment & Plan    Referring facility imaging notes a nondisplaced fracture at the junction of the right pubic ramus with the anterior column of the acetabulum and a nondisplaced fracture in the right sacral wing.  Non-operative management.  Weight bearing status - Weightbearing as tolerated BLE.  Follow up in 2 weeks  Lul Javier MD. Orthopedic Surgery.     Intracranial hemorrhage (HCC)- (present on admission)   Assessment & Plan    Referring facility CT head shows small multifocal areas of hemorrhage with a small cortical hemorrhage right frontal vertex and a 1cm deep white matter focus of hemorrhage int he right temporal region.   Repeat interval CT head with focal areas of hemorrhagic contusion in the right temporal and frontal lobes as described above, stable compared with the recent prior outside scan. Tiny acute extra-axial hemorrhage overlying the right frontal lobe, probably subdural, likely new.  Non-operative management.  Post traumatic pharmacologic seizure prophylaxis for 1 week.  Speech Language Pathology cognitive evaluation.  Renzo Gomez MD. Neurosurgery.     Ankle pain, left- (present on admission)   Assessment & Plan    Tenderness over the left medial malleolus.  Diagnostic imaging on admission negative for fracture or dislocation.  Mobilize as tolerated.      Contraindication to deep vein thrombosis (DVT) prophylaxis- (present on admission)    Assessment & Plan    Systemic anticoagulation contraindicated secondary to elevated bleeding risk.  Surveillance venous duplex scanning ordered for 5/4.     Skull fracture (HCC)- (present on admission)   Assessment & Plan    Bilateral skull fractures in the lamina Propecia with overlying facial swelling and contusion.  Non-operative management.  Renzo Gomez MD. Neurosurgery.      Platelet dysfunction due to aspirin (HCC)- (present on admission)   Assessment & Plan    Daily aspirin use.   Subthreshold platelet inhibition on admission TEG.      Chin laceration- (present on admission)   Assessment & Plan    Sutured repair of right chin laceration at the referring facility.     Trauma- (present on admission)   Assessment & Plan    Fall from 10ft ladder night prior to admission. Amnesic to event.  Trauma Yellow Transfer Activation from [facility].  Danis Hurd MD. Trauma Surgery.          Discussed patient condition with RN, Patient and trauma surgery, Dr. Perez.

## 2019-05-03 NOTE — ED NOTES
48 yoM. Fell off ladder yesterday, on ASA. Transfer from [facility]. R frontal and R temporal intraparenchymal bleeds, nondisplaced R pelvic fracture.

## 2019-05-03 NOTE — PROGRESS NOTES
"INITIAL TRAUMA TERTIARY SURVEY PROGRESS NOTE       INTERVAL EVENTS:  Admitted to ICU    UPDATED HISTORY:  Past Medical History:  has no past medical history on file.   Reviewed: Yes.    Past Surgical History:  has a past surgical history that includes other (11/2016).  Reviewed: Yes.    Allergies: No Known Allergies  Reviewed: Yes.    Family History: family history is not on file., denies significant family history  Reviewed: Yes.    Social History:  reports that he quit smoking about 2 years ago. His smoking use included Cigarettes. He has a 15.00 pack-year smoking history. He has never used smokeless tobacco. He reports that he drinks alcohol. He reports that he uses drugs, including Oral.  Reviewed: Yes    Home Medication Reconciliation:  Home Medications     Reviewed by Erick Patiño R.N. (Registered Nurse) on 05/02/19 at 2250  Med List Status: Complete   Medication Last Dose Status   aspirin EC (ECOTRIN) 81 MG Tablet Delayed Response 5/1/2019 Active              Reviewed: Yes.    PHYSICAL EXAMINATION:  Vitals: /78   Pulse 70   Temp 36.8 °C (98.3 °F) (Temporal)   Resp 18   Ht 1.828 m (5' 11.97\")   Wt 78.5 kg (173 lb 1 oz)   SpO2 92%   BMI 23.49 kg/m²   Constitutional:     General Appearance: appears stated age, is in no apparent distress.  HEENT:    Right periorbital ecchomisis and edema.. The pupils are equal, round, and reactive to light bilaterally. The extraocular muscles are intact bilaterally. The nares and oropharynx are clear. The midface and jaw are stable. No malocclusion is evident.  Neck:    The cervical spine is supple and non tender. Normal range of motion.  Respiratory:   Inspection: Unlabored respirations, no intercostal retractions, paradoxical motion, or accessory muscle use.   Palpation:  The chest is nontender. The clavicles are non deformed bilaterally.   Auscultation: normal, clear to auscultation.  Cardiovascular:   Auscultation: normal and regular rate and " rhythm.   Peripheral Pulses: Normal.   Abdomen:   Abdomen is soft, nontender, without organomegaly or masses.  Musculoskeletal:   The pelvis is stable.  No significant angulation, deformity, or soft tissue injury involving the upper and lower extremities. Normal range of motion.   Back:   The thoracolumbar spine was examined. Examination is remarkable for no significant tenderness, swelling, or deformity in the thoracolumbar region.  Skin:   The skin is warm and dry.  Neurologic:    David Coma Scale (GCS) 15. Neurologic examination revealed no focal deficits noted, mental status intact.  Psychiatric:   The patient does not appear depressed or anxious.    IMAGING:  CT-HEAD W/O   Final Result      1.  Focal areas of hemorrhagic contusion in the right temporal and frontal lobes as described above, stable compared with the recent prior outside scan.   2.  Tiny acute extra-axial hemorrhage overlying the right frontal lobe, probably subdural, likely new.   3.  As noted above, there is no mass effect or midline shift.   4.  Not mentioned above, there is a rounded hyperdense structure in the right maxillary region, just inferior to the zygomatic arch, measuring about 2.5 cm diameter, probably an acute hematoma.               INTERPRETING LOCATION:  [address] [place] [address], [facility]      DX-ANKLE 3+ VIEWS LEFT   Final Result      1.  There is no displaced left ankle fracture.      OUTSIDE IMAGES-DX CHEST   Final Result      OUTSIDE IMAGES-CT HEAD   Final Result      OUTSIDE IMAGES-CT FACE   Final Result      OUTSIDE IMAGES-CT CERVICAL SPINE   Final Result      OUTSIDE IMAGES-CT CHEST/ABDOMEN/PELVIS   Final Result      OUTSIDE IMAGES-DX PELVIS   Final Result        All current laboratory studies/radiology exams reviewed: Yes    ASSESSMENT AND PLAN:  Active Problems:  Intracranial hemorrhage (HCC)  Referring facility CT head shows small multifocal areas of hemorrhage with a small cortical hemorrhage right frontal vertex and a 1cm  deep white matter focus of hemorrhage int he right temporal region.   Repeat interval CT head with focal areas of hemorrhagic contusion in the right temporal and frontal lobes as described above, stable compared with the recent prior outside scan. Tiny acute extra-axial hemorrhage overlying the right frontal lobe, probably subdural, likely new.  Non-operative management.  Post traumatic pharmacologic seizure prophylaxis for 1 week.  Speech Language Pathology cognitive evaluation.  Renzo Gomez MD. Neurosurgery.    Platelet dysfunction due to aspirin ([facility])  Daily aspirin use.   Subthreshold platelet inhibition on admission TEG.     Skull fracture ([facility])  Bilateral skull fractures in the lamina Propecia with overlying facial swelling and contusion.  Non-operative management.  Renzo Gomez MD. Neurosurgery.     Pubic ramus fracture ([facility])  Referring facility imaging notes a nondisplaced fracture at the junction of the right pubic ramus with the anterior column of the acetabulum and a nondisplaced fracture in the right sacral wing.  Non-operative management.  Weight bearing status - Weightbearing as tolerated BLE.  Follow up in 2 weeks  Lul Javier MD. Orthopedic Surgery.    Trauma  Fall from [employer] ladder night prior to admission. Amnesic to event.  Trauma Yellow Transfer Activation from [facility].  Danis Hurd MD. Trauma Surgery.     Contraindication to deep vein thrombosis (DVT) prophylaxis  Systemic anticoagulation contraindicated secondary to elevated bleeding risk.  Surveillance venous duplex scanning ordered for 5/4.    Chin laceration  Sutured repair of right chin laceration at the referring facility.    Ankle pain, left  Tenderness over the left medial malleolus.  Diagnostic imaging on admission negative for fracture or dislocation.  Mobilize as tolerated.       Pending Consults:  None     Tertiary survey completed (mental status adequate for full examination): No further findings    Spine  cleared (radiologically and clinically): Yes

## 2019-05-03 NOTE — ED NOTES
"Assumed care of pt. Report received.     Pt unable to recall event. When asked if he fell off of the ladder last night he states, \"I think so\". His friend found him this afternoon and took him to [facility] ER.   Pt with significant RT sided facial swelling/perioribtal swelling/ecchymosis. no fx identified PTA. He does have some stiches to his chin, intact at this time.  He has RT sided pelvic fractures with a fracture at the junction of the RT pubic ramus and a nondisplaced fx of the RT sacral wing.     PERRLA. A&Ox4.   Needs repeat head CT this evening.     tdap updated PTA.   "

## 2019-05-03 NOTE — PROGRESS NOTES
Trauma Yellow  Ortho Team Paged [phone]  Arrived 1824  Pelvic Fracture  D/W Trauma, who will admit  Formal consult to follow

## 2019-05-03 NOTE — PROGRESS NOTES
Dr. valenzuela replied via tiger text.  Patient okay for transfer and no CT needed this morning.  MALINI Vazquez, updated.

## 2019-05-03 NOTE — PROGRESS NOTES
2 RN skin check complete    Ecchymosis, abrasion and edema noted to R eye    Abrasion to R cheek    Laceration w/ stitches under chin    Bruising noted to LUE inner AC area    Bruising noted to R posterior hip area    Bruising/abrasions noted to bilateral feet and ankles    Sacral mepilex in place    All pictures taken    No other areas of concern at this time.

## 2019-05-03 NOTE — CONSULTS
Neurosurgical consultation    52/2019 1945  Referring MD:  Dr. Virk, [facility] ED  Reason for referral:  Cerebral contusion     CHIEF COMPLAINT  headache.        HPI  Td Galarza is a 48 y.o. male who was climbing a ladder last night and fell approximately 10 feet, he does not recall the event, he did not realize he fell until he saw the blood on the bottom of the ladder, crawled in the bed, had a hard time waking up this morning.    REVIEW OF SYSTEMS  See HPI for further details. All other systems are negative.       PAST MEDICAL HISTORY  Past Medical History   No past medical history on file.        FAMILY HISTORY  Family History   No family history on file.        SOCIAL HISTORY  Social History               Social History   • Marital status: N/A       Spouse name: N/A   • Number of children: N/A   • Years of education: N/A           Social History Main Topics   • Smoking status: Not on file   • Smokeless tobacco: Not on file   • Alcohol use Not on file   • Drug use: Unknown   • Sexual activity: Not on file           Other Topics Concern   • Not on file          Social History Narrative   • No narrative on file            SURGICAL HISTORY  Past Surgical History   No past surgical history on file.        CURRENT MEDICATIONS  Home Medications    **Home medications have not yet been reviewed for this encounter**            ALLERGIES  No Known Allergies     PHYSICAL EXAM  VITAL SIGNS: /78   Pulse 83   Temp 37.3 °C (99.1 °F) (Temporal)   Resp (!) 22   Ht 1.829 m (6')   Wt 81.6 kg (180 lb)   SpO2 92%   BMI 24.41 kg/m²   Constitutional: Well developed well-nourished 48-year-old male in mild to moderate distress  HENT: Patient with a large amount of soft tissue swelling, ecchymosis throughout the right side of his maxillary and facial area that extends down to the chin,  No otorrhea no rhinorrhea  Eyes: PERRLA, EOMI, Conjunctiva normal, No discharge.   Neck:  no midline C-spine tenderness palpationSkin:  Warm, Dry, No erythema, No rash.   Musculoskeletal: full range of motion  Neurologic:   Awake, alert   Speech fluent appropriate  In no apparent distress  Affect, mood appropriate  Oriented x 3  Pupils 3 mm midline, reactive.  Conjugate gaze.  Visual fields full to confrontation  Face symmetric  Tongue midline without fasciculation  Facial sensation intact light touch  Hearing intact to conversation, light finger rub bilaterally  Motor:  Bilateral SCM, shoulder shrug, deltoid, bicep, tricep, , wrist extension, hand intrinsics                IP, thigh adduction, thigh abduction, quadriceps, hamstrings, dorsiflexion,                Plantar flexion, foot inversion, foot eversion, EHL 5/5 no pronator drift  Sensation:  Intact touch face, neck, torso, four extremities  Reflex:  No Rowley's no clonus  Finger-nose-finger, ANGELO unremarkable      RADIOLOGY/PROCEDURES  Head CT outside facility 5/2/2019:  7 mm right temporal 2 mm right high frontal hemorrhagic contusions; basal cisterns patent  HISTORY/REASON FOR EXAM:  And trauma follow-up.      TECHNIQUE/EXAM DESCRIPTION: CT scan of the head without contrast, 5/2/2019 9:06 PM.    Contiguous 5 mm axial sections were obtained from the skull base through the vertex.    Up to date radiation dose reduction adjustments have been utilized to meet ALARA standards for radiation dose reduction.    COMPARISON:  Prior scan today from an outside facility, 1546 hours    FINDINGS:   The ventricular system and cortical sulci are normal.  There is no midline shift or other mass effect. Again noted is an approximate 1 cm diameter focal intra-axial hemorrhage in the right temporal lobe with subtle smaller areas of adjacent   hemorrhage. This appearance is grossly unchanged from the prior scan. Additionally, there is a tiny approximately 3 mm hemorrhage in the high right frontal region, also unchanged. There is a tiny extra-axial hemorrhage overlying the right frontal lobe   measuring about  1 to 2 mm thickness, which is probably new. The calvaria are intact. The visualized paranasal sinuses show no unusual opacity.   Impression       1.  Focal areas of hemorrhagic contusion in the right temporal and frontal lobes as described above, stable compared with the recent prior outside scan.  2.  Tiny acute extra-axial hemorrhage overlying the right frontal lobe, probably subdural, likely new.  3.  As noted above, there is no mass effect or midline shift.  4.  Not mentioned above, there is a rounded hyperdense structure in the right maxillary region, just inferior to the zygomatic arch, measuring about 2.5 cm diameter, probably an acute hematoma.       AP:  48 year old male with two small right contusions with no significant mass effect.  Recommend observation.  Follow up CT in am, earlier if neurologic deterioration.

## 2019-05-03 NOTE — ED PROVIDER NOTES
ED Provider Note    CHIEF COMPLAINT  No chief complaint on file.      HPI  Td Galarza is a 48 y.o. male who presents to the ED as a trauma yellow transfer.  Apparently the patient was climbing a ladder last night fell approximately 10 feet, does not recall the event, he did not realize he fell until he saw the blood on the bottom of the ladder, crawled in the bed, had a hard time waking up this morning went to the emergency department.  In the emergency room the patient had a large amount of swelling and contusion to the right side of his face, had a CT scan of the head that shows 2 small intraparenchymal hemorrhages, multiple CTs were done including CT scan of the face, neck, chest abdomen pelvis which show a nondisplaced pelvic fracture.  Patient states he has mild tenderness palpation throughout the face, he does not have any chest pain, he does not have any shortness breath, abdominal pains, nausea vomiting    REVIEW OF SYSTEMS  See HPI for further details. All other systems are negative.      PAST MEDICAL HISTORY  No past medical history on file.    FAMILY HISTORY  No family history on file.    SOCIAL HISTORY  Social History     Social History   • Marital status: N/A     Spouse name: N/A   • Number of children: N/A   • Years of education: N/A     Social History Main Topics   • Smoking status: Not on file   • Smokeless tobacco: Not on file   • Alcohol use Not on file   • Drug use: Unknown   • Sexual activity: Not on file     Other Topics Concern   • Not on file     Social History Narrative   • No narrative on file       SURGICAL HISTORY  No past surgical history on file.    CURRENT MEDICATIONS  Home Medications    **Home medications have not yet been reviewed for this encounter**         ALLERGIES  No Known Allergies    PHYSICAL EXAM  VITAL SIGNS: /78   Pulse 83   Temp 37.3 °C (99.1 °F) (Temporal)   Resp (!) 22   Ht 1.829 m (6')   Wt 81.6 kg (180 lb)   SpO2 92%   BMI 24.41 kg/m²   Constitutional:  Well developed well-nourished 48-year-old male in mild to moderate distress  HENT: Patient with a large amount of soft tissue swelling, ecchymosis throughout the right side of his maxillary and facial area that extends down to the chin, he does have some tenderness palpation throughout the maxilla, mandible area, TMs are clear  Eyes: PERRLA, EOMI, Conjunctiva normal, No discharge.   Neck: Trachea is midline, no midline C-spine tenderness palpation  Cardiovascular: Normal heart rate, Normal rhythm   Thorax & Lungs: Normal breath sounds, No respiratory distress, No wheezing, No chest tenderness.   Abdomen: Bowel sounds normal, Soft, No tenderness, No masses, No pulsatile masses.   Skin: Warm, Dry, No erythema, No rash.   Back: No T or L-spine tenderness palpation  Extremities: Intact distal pulses, No edema, No tenderness.   Musculoskeletal: No gross deformities pelvis is nontender  Neurologic: Awake alert slightly confused, good muscle strength throughout    RADIOLOGY/PROCEDURES  DX-ANKLE 3+ VIEWS LEFT   Final Result      1.  There is no displaced left ankle fracture.      OUTSIDE IMAGES-DX CHEST   Final Result      OUTSIDE IMAGES-CT HEAD   Final Result      OUTSIDE IMAGES-CT FACE   Final Result      OUTSIDE IMAGES-CT CERVICAL SPINE   Final Result      OUTSIDE IMAGES-CT CHEST/ABDOMEN/PELVIS   Final Result      OUTSIDE IMAGES-DX PELVIS   Final Result            COURSE & MEDICAL DECISION MAKING  Pertinent Labs & Imaging studies reviewed. (See chart for details)  Patient is transferred from outside facility secondary to intraparenchymal hemorrhages along with a nondisplaced pelvic fracture, the patient is awake and alert but still slightly confused.  Dr. finn is at the bedside upon the patient's arrival, he will admit the patient to the ICU on consult neurosurgery.        FINAL IMPRESSION  1. Intraparenchymal hemorrhage of brain (HCC)    2. Closed nondisplaced fracture of pubis, unspecified laterality, initial encounter  ([facility])          This dictation was created using voice recognition software. The accuracy of the dictation is limited to the abilities of the software. I expect there may be some errors of grammar and possibly content. The nursing notes were reviewed and certain aspects of this information were incorporated into this note.    Electronically signed by: Asim Virk, 5/2/2019

## 2019-05-03 NOTE — ASSESSMENT & PLAN NOTE
Referring facility imaging notes a nondisplaced fracture at the junction of the right pubic ramus with the anterior column of the acetabulum and a nondisplaced fracture in the right sacral wing.  Non-operative management.  Weight bearing status - Weightbearing as tolerated BLE.  Follow up in 2 weeks  Lul Javier MD. Orthopedic Surgery.

## 2019-05-03 NOTE — PROGRESS NOTES
Dr. Javier at bedside with Ortho to assess patient at 2005. Pt updated on plan of care. All questions and concerns addressed.

## 2019-05-03 NOTE — ASSESSMENT & PLAN NOTE
Sutured repair of right chin laceration at the referring facility.  Remove sutures in 5 days (5/7)

## 2019-05-03 NOTE — ASSESSMENT & PLAN NOTE
Fall from 10ft ladder night prior to admission. Amnesic to event.  Trauma Yellow Transfer Activation from [facility].  Danis Hurd MD. Trauma Surgery.

## 2019-05-04 PROBLEM — T39.015A PLATELET DYSFUNCTION DUE TO ASPIRIN (HCC): Status: RESOLVED | Noted: 2019-05-02 | Resolved: 2019-05-04

## 2019-05-04 PROBLEM — D69.1 PLATELET DYSFUNCTION DUE TO ASPIRIN (HCC): Status: RESOLVED | Noted: 2019-05-02 | Resolved: 2019-05-04

## 2019-05-04 PROBLEM — M25.572 ANKLE PAIN, LEFT: Status: RESOLVED | Noted: 2019-05-02 | Resolved: 2019-05-04

## 2019-05-04 LAB
ANION GAP SERPL CALC-SCNC: 7 MMOL/L (ref 0–11.9)
BASOPHILS # BLD AUTO: 0.5 % (ref 0–1.8)
BASOPHILS # BLD: 0.04 K/UL (ref 0–0.12)
BUN SERPL-MCNC: 9 MG/DL (ref 8–22)
CALCIUM SERPL-MCNC: 8.2 MG/DL (ref 8.5–10.5)
CHLORIDE SERPL-SCNC: 108 MMOL/L (ref 96–112)
CO2 SERPL-SCNC: 25 MMOL/L (ref 20–33)
CREAT SERPL-MCNC: 0.83 MG/DL (ref 0.5–1.4)
EOSINOPHIL # BLD AUTO: 0.08 K/UL (ref 0–0.51)
EOSINOPHIL NFR BLD: 1 % (ref 0–6.9)
ERYTHROCYTE [DISTWIDTH] IN BLOOD BY AUTOMATED COUNT: 43.8 FL (ref 35.9–50)
GLUCOSE SERPL-MCNC: 108 MG/DL (ref 65–99)
HCT VFR BLD AUTO: 38.3 % (ref 42–52)
HGB BLD-MCNC: 12.9 G/DL (ref 14–18)
IMM GRANULOCYTES # BLD AUTO: 0.03 K/UL (ref 0–0.11)
IMM GRANULOCYTES NFR BLD AUTO: 0.4 % (ref 0–0.9)
LYMPHOCYTES # BLD AUTO: 1.37 K/UL (ref 1–4.8)
LYMPHOCYTES NFR BLD: 17.8 % (ref 22–41)
MCH RBC QN AUTO: 30.7 PG (ref 27–33)
MCHC RBC AUTO-ENTMCNC: 33.7 G/DL (ref 33.7–35.3)
MCV RBC AUTO: 91.2 FL (ref 81.4–97.8)
MONOCYTES # BLD AUTO: 0.7 K/UL (ref 0–0.85)
MONOCYTES NFR BLD AUTO: 9.1 % (ref 0–13.4)
NEUTROPHILS # BLD AUTO: 5.46 K/UL (ref 1.82–7.42)
NEUTROPHILS NFR BLD: 71.2 % (ref 44–72)
NRBC # BLD AUTO: 0 K/UL
NRBC BLD-RTO: 0 /100 WBC
PLATELET # BLD AUTO: 181 K/UL (ref 164–446)
PMV BLD AUTO: 10.9 FL (ref 9–12.9)
POTASSIUM SERPL-SCNC: 3.8 MMOL/L (ref 3.6–5.5)
RBC # BLD AUTO: 4.2 M/UL (ref 4.7–6.1)
SODIUM SERPL-SCNC: 140 MMOL/L (ref 135–145)
WBC # BLD AUTO: 7.7 K/UL (ref 4.8–10.8)

## 2019-05-04 PROCEDURE — 700102 HCHG RX REV CODE 250 W/ 637 OVERRIDE(OP): Performed by: SURGERY

## 2019-05-04 PROCEDURE — 700101 HCHG RX REV CODE 250: Performed by: SURGERY

## 2019-05-04 PROCEDURE — 80048 BASIC METABOLIC PNL TOTAL CA: CPT

## 2019-05-04 PROCEDURE — 700112 HCHG RX REV CODE 229: Performed by: SURGERY

## 2019-05-04 PROCEDURE — 85025 COMPLETE CBC W/AUTO DIFF WBC: CPT

## 2019-05-04 PROCEDURE — 36415 COLL VENOUS BLD VENIPUNCTURE: CPT

## 2019-05-04 PROCEDURE — A9270 NON-COVERED ITEM OR SERVICE: HCPCS | Performed by: SURGERY

## 2019-05-04 PROCEDURE — 770001 HCHG ROOM/CARE - MED/SURG/GYN PRIV*

## 2019-05-04 PROCEDURE — 97162 PT EVAL MOD COMPLEX 30 MIN: CPT

## 2019-05-04 RX ADMIN — POLYETHYLENE GLYCOL 3350 1 PACKET: 17 POWDER, FOR SOLUTION ORAL at 05:55

## 2019-05-04 RX ADMIN — ACETAMINOPHEN 1000 MG: 325 TABLET, FILM COATED ORAL at 17:01

## 2019-05-04 RX ADMIN — DOCUSATE SODIUM 100 MG: 100 CAPSULE, LIQUID FILLED ORAL at 05:56

## 2019-05-04 RX ADMIN — POLYETHYLENE GLYCOL 3350 1 PACKET: 17 POWDER, FOR SOLUTION ORAL at 17:02

## 2019-05-04 RX ADMIN — ACETAMINOPHEN 1000 MG: 325 TABLET, FILM COATED ORAL at 22:05

## 2019-05-04 RX ADMIN — ACETAMINOPHEN 1000 MG: 325 TABLET, FILM COATED ORAL at 05:55

## 2019-05-04 RX ADMIN — LEVETIRACETAM 500 MG: 500 TABLET ORAL at 17:01

## 2019-05-04 RX ADMIN — DOCUSATE SODIUM 100 MG: 100 CAPSULE, LIQUID FILLED ORAL at 17:01

## 2019-05-04 RX ADMIN — Medication 1 EACH: at 12:28

## 2019-05-04 RX ADMIN — OXYCODONE HYDROCHLORIDE 5 MG: 5 TABLET ORAL at 02:41

## 2019-05-04 RX ADMIN — Medication 1 EACH: at 17:02

## 2019-05-04 RX ADMIN — ACETAMINOPHEN 1000 MG: 325 TABLET, FILM COATED ORAL at 12:28

## 2019-05-04 RX ADMIN — Medication 1 EACH: at 05:56

## 2019-05-04 RX ADMIN — OXYCODONE HYDROCHLORIDE 5 MG: 5 TABLET ORAL at 23:05

## 2019-05-04 RX ADMIN — SENNOSIDES, DOCUSATE SODIUM 1 TABLET: 50; 8.6 TABLET, FILM COATED ORAL at 22:05

## 2019-05-04 RX ADMIN — LEVETIRACETAM 500 MG: 500 TABLET ORAL at 05:56

## 2019-05-04 ASSESSMENT — ENCOUNTER SYMPTOMS
MYALGIAS: 0
SENSORY CHANGE: 0
DIZZINESS: 0
FEVER: 0
CHILLS: 0
FOCAL WEAKNESS: 0
NECK PAIN: 0
VOMITING: 0
NAUSEA: 0
DOUBLE VISION: 0
SHORTNESS OF BREATH: 0
ABDOMINAL PAIN: 0
BLURRED VISION: 0
BACK PAIN: 0

## 2019-05-04 ASSESSMENT — COGNITIVE AND FUNCTIONAL STATUS - GENERAL
CLIMB 3 TO 5 STEPS WITH RAILING: A LITTLE
STANDING UP FROM CHAIR USING ARMS: A LITTLE
WALKING IN HOSPITAL ROOM: A LITTLE
SUGGESTED CMS G CODE MODIFIER MOBILITY: CK
MOVING FROM LYING ON BACK TO SITTING ON SIDE OF FLAT BED: A LITTLE
MOVING TO AND FROM BED TO CHAIR: UNABLE
MOBILITY SCORE: 17

## 2019-05-04 ASSESSMENT — GAIT ASSESSMENTS
DEVIATION: BRADYKINETIC;DECREASED BASE OF SUPPORT
GAIT LEVEL OF ASSIST: MINIMAL ASSIST
DISTANCE (FEET): 150

## 2019-05-04 NOTE — PROGRESS NOTES
Pt arrived to floor via transport, pt oriented to room and call light system, bed alarm on, bed locked and in lowest position. Hourly rounding in place.

## 2019-05-04 NOTE — CARE PLAN
Problem: Communication  Goal: The ability to communicate needs accurately and effectively will improve  Outcome: PROGRESSING AS EXPECTED      Problem: Pain Management  Goal: Pain level will decrease to patient's comfort goal  Outcome: PROGRESSING AS EXPECTED      Problem: Urinary Elimination:  Goal: Ability to reestablish a normal urinary elimination pattern will improve  Outcome: PROGRESSING AS EXPECTED  Patient voiding adequately throughout shift. No complaints of bladder fullness

## 2019-05-04 NOTE — PROGRESS NOTES
Neurosurgery Progress Note    Subjective:  No acute events. Pt sleeping, rouses easily to voice. Just had pain medication, headache improved.     Exam:  Drowsy. Oriented x3. Fluent Speech.   3 PERRL, EOMI. Denies blurry or double vision.   Tongue midline without fasciculation. No facial droop.   Ecchymosis around right eye  Hearing intact.   HANEY with f/s  Sensation: Intact throughout.   Eating, drinking. Denies n/v.  Pain controlled.   Ambulatory.     BP  Min: 123/80  Max: 125/86  Pulse  Av.6  Min: 49  Max: 88  Resp  Av.3  Min: 14  Max: 40  Temp  Av.9 °C (98.4 °F)  Min: 36.6 °C (97.8 °F)  Max: 37.7 °C (99.9 °F)  SpO2  Av %  Min: 93 %  Max: 97 %    No Data Recorded    Recent Labs      19   WBC  14.2*  9.3  7.7   RBC  4.71  4.01*  4.20*   HEMOGLOBIN  14.3  12.5*  12.9*   HEMATOCRIT  43.2  37.2*  38.3*   MCV  91.7  92.8  91.2   MCH  30.4  31.2  30.7   MCHC  33.1*  33.6*  33.7   RDW  44.1  45.0  43.8   PLATELETCT  223  196  181   MPV  10.5  10.7  10.9     Recent Labs      19   SODIUM  138  136  140   POTASSIUM  4.4  3.8  3.8   CHLORIDE  105  109  108   CO2  19*  22  25   GLUCOSE  93  120*  108*   BUN  14  11  9   CREATININE  0.83  0.80  0.83   CALCIUM  8.4*  7.9*  8.2*     Recent Labs      19   APTT  24.5*   INR  1.06     Recent Labs      19   REACTMIN  4.2*   CLOTKINET  1.8   CLOTANGL  65.2   MAXCLOTS  69.5   [id number]  0.0   PRCINADP  28.8   PRCINAA  44.3       Intake/Output       19 - 19 0659 19 - 19 Total  Total       Intake    P.O.  250  -- 250  --  -- --    P.O. 250 -- 250 -- -- --    I.V.  500  -- 500  --  -- --    Volume (mL) (NS infusion) 500 -- 500 -- -- --    Total Intake 750 -- 750 -- -- --       Output    Urine  1510  -- 1510  --  -- --    Number of Times Voided 3 x 2 x 5 x -- -- --     Straight Catheter 1000 -- 1000 -- -- --    Urine Void (mL) 510 -- 510 -- -- --    Total Output 1510 -- 1510 -- -- --       Net I/O     -760 -- -760 -- -- --            Intake/Output Summary (Last 24 hours) at 05/04/19 0828  Last data filed at 05/03/19 1800   Gross per 24 hour   Intake              420 ml   Output             1510 ml   Net            -1090 ml       $ Bladder Scan Results (mL): 999    • fentaNYL  25-50 mcg Q3HRS PRN   • NS   Continuous   • Respiratory Care per Protocol   Continuous RT   • Pharmacy Consult Request  1 Each PHARMACY TO DOSE   • docusate sodium  100 mg BID   • senna-docusate  1 Tab Nightly   • senna-docusate  1 Tab Q24HRS PRN   • polyethylene glycol/lytes  1 Packet BID   • magnesium hydroxide  30 mL DAILY   • bisacodyl  10 mg Q24HRS PRN   • fleet  1 Each Once PRN   • acetaminophen  1,000 mg Q6HRS   • oxyCODONE immediate-release  5 mg Q3HRS PRN   • oxyCODONE immediate release  10 mg Q3HRS PRN   • ondansetron  4 mg Q4HRS PRN   • levETIRAcetam  500 mg BID   • bacitracin-polymyxin b   TID       Assessment and Plan:  Hospital day #1  POD #na  Prophylactic anticoagulation: no         Start date/time: pt ambulatory    Plan:   Stable neuro exam  No NSG intervention indicated at this time  PT/OT-continue therapies  Neuro checks q4hrs  NO ASA or anticoagulants  Keppra x 7 days for seizure prophylaxis   Continue to monitor   Continue medical management

## 2019-05-04 NOTE — PROGRESS NOTES
1700: Pt voiding adequate urine but with urgency and and difficulty. Bladder scan ordered.    1730: Pt straight cathed, 1 L removed.

## 2019-05-04 NOTE — DISCHARGE PLANNING
Anticipated Discharge Disposition:   Home with help from spouse    Action:    Spoke to patient.  He was lying down & resting in bed, oriented x 4.  Kept eyes closed.  Right eye ecchymosis.  Pt stated he lives with his wife and she is driving to [place] and will be here to drive him home.  Her name is Jana and telephone # is [phone]. Pt stated he was independent with ADLs and IADLs prior to admission.  No difficulty with ambulating in hospital.  Pt stated his concern now is that he cannot remember his cell phone passcode.  RN CM attempted to leave vm  For his wife, however, the mail box full.    SLP cog eval indicates patient will need close supervision for higher level problem solving, medication management, safety awareness.    Barriers to Discharge:    General surgery clearance  Neurosurgery clearance    Plan:    Call patient's wife, Jana.  Contact [facility] for assistance with post hospital care requirements.    Care Transition Team Assessment    Information Source  Orientation : Oriented x 4  Information Given By: Patient  Informant's Name: Td Galarza  Who is responsible for making decisions for patient? : Patient    Readmission Evaluation  Is this a readmission?: No    Elopement Risk  Legal Hold: No  Ambulatory or Self Mobile in Wheelchair: Yes  Disoriented: No  Psychiatric Symptoms: None  History of Wandering: No  Elopement this Admit: No  Vocalizing Wanting to Leave: No  Displays Behaviors, Body Language Wanting to Leave: No-Not at Risk for Elopement  Elopement Risk: Not at Risk for Elopement    Interdisciplinary Discharge Planning  Lives with - Patient's Self Care Capacity: Significant Other, Child Less than 18 Years of Age (girlfriend and 10 y/o child)  Patient or legal guardian wants to designate a caregiver (see row info): No    Discharge Preparedness  What is your plan after discharge?: Home with help  What are your discharge supports?: Spouse  Prior Functional Level: Ambulatory, Drives  Self, Independent with Activities of Daily Living, Independent with Medication Management  Difficulity with ADLs: None  Difficulity with IADLs: Cooking, Driving, Keeping track of finances, Managing medication    Functional Assesment  Prior Functional Level: Ambulatory, Drives Self, Independent with Activities of Daily Living, Independent with Medication Management    Finances  Financial Barriers to Discharge: No    Vision / Hearing Impairment  Vision Impairment : No  Hearing Impairment : No         Advance Directive  Advance Directive?: None    Domestic Abuse  Have you ever been the victim of abuse or violence?: No  Physical Abuse or Sexual Abuse: No  Verbal Abuse or Emotional Abuse: No         Discharge Risks or Barriers  Discharge risks or barriers?: No    Anticipated Discharge Information  Anticipated discharge disposition: Home  Discharge Address:  ([address] [place] [id number])  Discharge Contact Phone Number: [phone]

## 2019-05-04 NOTE — PROGRESS NOTES
Pt aaox4, fatigued. HANEY 5/5. Denies N/T. Denies N/V. Up w/ CGA, slightly unsteady gait. Pt c/o L hip pain, controlled with PO pain meds PRN. +BS. Voiding w/o difficulty. R eye Ecchymosis. Reviewed poc with pt-verbalized understanding. Bed alarm in use. Call light in reach. PT/OT eval pending. SLP to assess if pt needs intermittent or 24 hour supervision.

## 2019-05-04 NOTE — DISCHARGE PLANNING
Anticipated Discharge Disposition:   Home with help from spouse    Action:    Spoke with ES Silva CM with [facility] [phone].  She will assist with care coordination needs.  Provided contact information for her to request films.    SLP: recommending home health for speech therapy.  PT recommending inpatient TBI rehab, or home health   Unable to leave vm for patient's wife.  Mail box full.    Barriers to Discharge:    Communication with patient's wife  OT eval    Plan:    Call patient's wife.  Review OT eval  F/U with ES Silva

## 2019-05-04 NOTE — THERAPY
"Physical Therapy Evaluation completed.   Bed Mobility:  Supine to Sit: Stand by Assist (multiple cues for initiation)  Transfers: Sit to Stand: Minimal Assist  Gait: Level Of Assist: Minimal Assist with hand hold assist   Plan of Care: Will benefit from Physical Therapy 4 times per week  Discharge Recommendations: Equipment: Will Continue to Assess for Equipment Needs , anticipate no ambulatory device, pt's balance diminishes with new stimuli; see OT note for ADL equipment     Pt presents with impaired activity tolerance, dynamic balance and cognition s/p fall from ladder sustaining right frontal hemorrhage and right zygomatic arch fx both non op. Pt is most limited by functional cognition, cannot sequence 2 step tasks; ambulatory balance is fair without any external stimuli however when dual tasking of head turns, conversation or direction occur, pt requiring min A to regain and maintain balance; in current condition would recommend optimal recovery to occur and an inpatient TBI rehab given probable need of all 3 disciplines, however if wife is willing to provide 24/7 support and can be trained then can return home with follow up home health PT for progression back to work hardening; defer recommendations to speech and OT for optimal dc. Will follow.         See \"Rehab Therapy-Acute\" Patient Summary Report for complete documentation.     "

## 2019-05-04 NOTE — PROGRESS NOTES
MOBILITY NOTE    Surgery patient?: No  Date of surgery: N/A  Ambulated 50 ft on day of surgery? (N/A if today is not date of surgery):N/A  Number of times ambulated 50 feet or greater today: 2  Patient has been up to chair, edge of bed or HOB 90 degrees for all meals?: N/A  Goal met? (goal is ambulating at least 50 feet 2 times on day shift, one time on night shift): Yes  If patient did not meet mobility goal, why?:N/A

## 2019-05-04 NOTE — CARE PLAN
Problem: Safety  Goal: Will remain free from falls    Intervention: Implement fall precautions  Bed alarm in use. Call light w/in reach. Instructed pt to call for assistance before getting OOB- pt verbalized understanding.        Problem: Pain Management  Goal: Pain level will decrease to patient's comfort goal    Intervention: Follow pain managment plan developed in collaboration with patient and Interdisciplinary Team  Oxy given PRN with +results. Educated pt on importance of pain control- pt verbalized understanding.

## 2019-05-04 NOTE — PROGRESS NOTES
Trauma / Surgical Daily Progress Note    Date of Service  5/4/2019    Chief Complaint  48 y.o. male admitted 5/2/2019 with Trauma    Interval Events  Transfer from ICU to neuro  SLP recommendations reviewed, PT/OT pending  Adequate pain control    - SLP to reinforce recommendations and limitations (patient runs a [employer])  - Likely discharge home with wife in 24-48 hours    Review of Systems  Review of Systems   Constitutional: Negative for chills and fever.   Eyes: Negative for blurred vision and double vision.   Respiratory: Negative for shortness of breath.    Cardiovascular: Negative for chest pain.   Gastrointestinal: Negative for abdominal pain, nausea and vomiting.   Genitourinary:        Voiding   Musculoskeletal: Positive for joint pain (mild pelivc pain). Negative for back pain, myalgias and neck pain.   Neurological: Negative for dizziness, sensory change and focal weakness.        Vital Signs  Temp:  [36.6 °C (97.8 °F)-37.7 °C (99.9 °F)] 36.9 °C (98.4 °F)  Pulse:  [60-88] 64  Resp:  [14-29] 18  BP: (123-125)/(76-86) 125/76  SpO2:  [94 %-97 %] 94 %    Physical Exam  Physical Exam   Constitutional: He is oriented to person, place, and time. He appears well-developed. No distress.   Eyes: Right conjunctiva has a hemorrhage (mild).   Right periorbital ecchymosis and edema   Neck: No JVD present. No tracheal deviation present.   Cardiovascular: Normal rate and intact distal pulses.    Pulmonary/Chest: Effort normal. No respiratory distress. He exhibits no tenderness.   Abdominal: Soft. He exhibits no distension. There is no tenderness. There is no guarding.   Musculoskeletal:   Moves all extremities   Neurological: He is alert and oriented to person, place, and time.   Skin: Skin is warm and dry.   Nursing note and vitals reviewed.      Laboratory  Recent Results (from the past 24 hour(s))   CBC WITH DIFFERENTIAL    Collection Time: 05/04/19  2:29 AM   Result Value Ref Range    WBC 7.7 4.8 - 10.8 K/uL     RBC 4.20 (L) 4.70 - 6.10 M/uL    Hemoglobin 12.9 (L) 14.0 - 18.0 g/dL    Hematocrit 38.3 (L) 42.0 - 52.0 %    MCV 91.2 81.4 - 97.8 fL    MCH 30.7 27.0 - 33.0 pg    MCHC 33.7 33.7 - 35.3 g/dL    RDW 43.8 35.9 - 50.0 fL    Platelet Count 181 164 - 446 K/uL    MPV 10.9 9.0 - 12.9 fL    Neutrophils-Polys 71.20 44.00 - 72.00 %    Lymphocytes 17.80 (L) 22.00 - 41.00 %    Monocytes 9.10 0.00 - 13.40 %    Eosinophils 1.00 0.00 - 6.90 %    Basophils 0.50 0.00 - 1.80 %    Immature Granulocytes 0.40 0.00 - 0.90 %    Nucleated RBC 0.00 /100 WBC    Neutrophils (Absolute) 5.46 1.82 - 7.42 K/uL    Lymphs (Absolute) 1.37 1.00 - 4.80 K/uL    Monos (Absolute) 0.70 0.00 - 0.85 K/uL    Eos (Absolute) 0.08 0.00 - 0.51 K/uL    Baso (Absolute) 0.04 0.00 - 0.12 K/uL    Immature Granulocytes (abs) 0.03 0.00 - 0.11 K/uL    NRBC (Absolute) 0.00 K/uL   Basic Metabolic Panel    Collection Time: 05/04/19  2:29 AM   Result Value Ref Range    Sodium 140 135 - 145 mmol/L    Potassium 3.8 3.6 - 5.5 mmol/L    Chloride 108 96 - 112 mmol/L    Co2 25 20 - 33 mmol/L    Glucose 108 (H) 65 - 99 mg/dL    Bun 9 8 - 22 mg/dL    Creatinine 0.83 0.50 - 1.40 mg/dL    Calcium 8.2 (L) 8.5 - 10.5 mg/dL    Anion Gap 7.0 0.0 - 11.9   ESTIMATED GFR    Collection Time: 05/04/19  2:29 AM   Result Value Ref Range    GFR If African American >60 >60 mL/min/1.73 m 2    GFR If Non African American >60 >60 mL/min/1.73 m 2       Fluids    Intake/Output Summary (Last 24 hours) at 05/04/19 1109  Last data filed at 05/03/19 1800   Gross per 24 hour   Intake              170 ml   Output             1510 ml   Net            -1340 ml       Core Measures & Quality Metrics  Labs reviewed, Medications reviewed and Radiology images reviewed  Fontaine catheter: No Fontaine      DVT Prophylaxis: Contraindicated - High bleeding risk  DVT prophylaxis - mechanical: SCDs          Total Score: 9    ETOH Screening  CAGE Score: 1  Assessment complete date: 5/3/2019        Assessment/Plan  Pubic  ramus fracture (HCC)- (present on admission)   Assessment & Plan    Referring facility imaging notes a nondisplaced fracture at the junction of the right pubic ramus with the anterior column of the acetabulum and a nondisplaced fracture in the right sacral wing.  Non-operative management.  Weight bearing status - Weightbearing as tolerated BLE.  Follow up in 2 weeks  Lul Javier MD. Orthopedic Surgery.      Intracranial hemorrhage (HCC)- (present on admission)   Assessment & Plan    Referring facility CT head shows small multifocal areas of hemorrhage with a small cortical hemorrhage right frontal vertex and a 1cm deep white matter focus of hemorrhage int he right temporal region.   Repeat interval CT head with focal areas of hemorrhagic contusion in the right temporal and frontal lobes as described above, stable compared with the recent prior outside scan. Tiny acute extra-axial hemorrhage overlying the right frontal lobe, probably subdural, likely new.  Non-operative management.  Post traumatic pharmacologic seizure prophylaxis for 1 week.  Speech Language Pathology cognitive evaluation completed.  Renzo Gomez MD. Neurosurgery.      Contraindication to deep vein thrombosis (DVT) prophylaxis- (present on admission)   Assessment & Plan    Systemic anticoagulation contraindicated secondary to elevated bleeding risk.  Surveillance venous duplex scanning ordered for 5/4.      Skull fracture (HCC)- (present on admission)   Assessment & Plan    Bilateral skull fractures in the lamina Propecia with overlying facial swelling and contusion.  Non-operative management.  Renzo Gomez MD. Neurosurgery.       Chin laceration- (present on admission)   Assessment & Plan    Sutured repair of right chin laceration at the referring facility.  Remove sutures in 5 days (5/7)      Trauma- (present on admission)   Assessment & Plan    Fall from 10ft ladder night prior to admission. Amnesic to event.  Trauma Yellow Transfer  Activation from [facility].  Danis Hurd MD. Trauma Surgery.           Discussed patient condition with RN, Patient and trauma surgery. Dr. Hurd

## 2019-05-05 ENCOUNTER — APPOINTMENT (OUTPATIENT)
Dept: RADIOLOGY | Facility: MEDICAL CENTER | Age: 49
DRG: 965 | End: 2019-05-05
Attending: NURSE PRACTITIONER
Payer: COMMERCIAL

## 2019-05-05 PROCEDURE — A9270 NON-COVERED ITEM OR SERVICE: HCPCS | Performed by: SURGERY

## 2019-05-05 PROCEDURE — 97530 THERAPEUTIC ACTIVITIES: CPT

## 2019-05-05 PROCEDURE — 97166 OT EVAL MOD COMPLEX 45 MIN: CPT

## 2019-05-05 PROCEDURE — 700111 HCHG RX REV CODE 636 W/ 250 OVERRIDE (IP): Performed by: NURSE PRACTITIONER

## 2019-05-05 PROCEDURE — A9270 NON-COVERED ITEM OR SERVICE: HCPCS | Performed by: NURSE PRACTITIONER

## 2019-05-05 PROCEDURE — 700102 HCHG RX REV CODE 250 W/ 637 OVERRIDE(OP): Performed by: SURGERY

## 2019-05-05 PROCEDURE — 700101 HCHG RX REV CODE 250: Performed by: SURGERY

## 2019-05-05 PROCEDURE — 770001 HCHG ROOM/CARE - MED/SURG/GYN PRIV*

## 2019-05-05 PROCEDURE — 97116 GAIT TRAINING THERAPY: CPT

## 2019-05-05 PROCEDURE — 93970 EXTREMITY STUDY: CPT

## 2019-05-05 PROCEDURE — 97112 NEUROMUSCULAR REEDUCATION: CPT

## 2019-05-05 PROCEDURE — 93970 EXTREMITY STUDY: CPT | Mod: 26 | Performed by: SURGERY

## 2019-05-05 PROCEDURE — 700102 HCHG RX REV CODE 250 W/ 637 OVERRIDE(OP): Performed by: NURSE PRACTITIONER

## 2019-05-05 PROCEDURE — 700112 HCHG RX REV CODE 229: Performed by: SURGERY

## 2019-05-05 RX ORDER — DEXAMETHASONE SODIUM PHOSPHATE 4 MG/ML
4 INJECTION, SOLUTION INTRA-ARTICULAR; INTRALESIONAL; INTRAMUSCULAR; INTRAVENOUS; SOFT TISSUE EVERY 6 HOURS
Status: COMPLETED | OUTPATIENT
Start: 2019-05-05 | End: 2019-05-06

## 2019-05-05 RX ORDER — BUTALBITAL, ACETAMINOPHEN AND CAFFEINE 50; 325; 40 MG/1; MG/1; MG/1
1 TABLET ORAL EVERY 6 HOURS PRN
Status: DISCONTINUED | OUTPATIENT
Start: 2019-05-05 | End: 2019-05-06 | Stop reason: HOSPADM

## 2019-05-05 RX ORDER — ACETAMINOPHEN 325 MG/1
650 TABLET ORAL EVERY 6 HOURS
Status: DISCONTINUED | OUTPATIENT
Start: 2019-05-05 | End: 2019-05-06 | Stop reason: HOSPADM

## 2019-05-05 RX ADMIN — DEXAMETHASONE SODIUM PHOSPHATE 4 MG: 4 INJECTION, SOLUTION INTRA-ARTICULAR; INTRALESIONAL; INTRAMUSCULAR; INTRAVENOUS; SOFT TISSUE at 15:03

## 2019-05-05 RX ADMIN — DEXAMETHASONE SODIUM PHOSPHATE 4 MG: 4 INJECTION, SOLUTION INTRA-ARTICULAR; INTRALESIONAL; INTRAMUSCULAR; INTRAVENOUS; SOFT TISSUE at 20:22

## 2019-05-05 RX ADMIN — OXYCODONE HYDROCHLORIDE 5 MG: 5 TABLET ORAL at 10:55

## 2019-05-05 RX ADMIN — DOCUSATE SODIUM 100 MG: 100 CAPSULE, LIQUID FILLED ORAL at 18:00

## 2019-05-05 RX ADMIN — POLYETHYLENE GLYCOL 3350 1 PACKET: 17 POWDER, FOR SOLUTION ORAL at 16:52

## 2019-05-05 RX ADMIN — BUTALBITAL, ACETAMINOPHEN, AND CAFFEINE 1 TABLET: 50; 325; 40 TABLET ORAL at 12:26

## 2019-05-05 RX ADMIN — LEVETIRACETAM 500 MG: 500 TABLET ORAL at 16:52

## 2019-05-05 RX ADMIN — LEVETIRACETAM 500 MG: 500 TABLET ORAL at 05:05

## 2019-05-05 RX ADMIN — Medication 1 EACH: at 12:26

## 2019-05-05 RX ADMIN — Medication 1 EACH: at 05:05

## 2019-05-05 RX ADMIN — POLYETHYLENE GLYCOL 3350 1 PACKET: 17 POWDER, FOR SOLUTION ORAL at 05:05

## 2019-05-05 RX ADMIN — ACETAMINOPHEN 1000 MG: 325 TABLET, FILM COATED ORAL at 05:05

## 2019-05-05 RX ADMIN — DOCUSATE SODIUM 100 MG: 100 CAPSULE, LIQUID FILLED ORAL at 05:05

## 2019-05-05 RX ADMIN — ACETAMINOPHEN 650 MG: 325 TABLET, FILM COATED ORAL at 13:33

## 2019-05-05 RX ADMIN — Medication 1 EACH: at 16:52

## 2019-05-05 RX ADMIN — ACETAMINOPHEN 650 MG: 325 TABLET, FILM COATED ORAL at 20:21

## 2019-05-05 ASSESSMENT — ENCOUNTER SYMPTOMS
FEVER: 0
NECK PAIN: 0
ABDOMINAL PAIN: 0
FOCAL WEAKNESS: 0
DOUBLE VISION: 0
DIZZINESS: 0
SHORTNESS OF BREATH: 0
BLURRED VISION: 0
VOMITING: 0
NAUSEA: 0
BACK PAIN: 0
MYALGIAS: 0
CHILLS: 0
SENSORY CHANGE: 0

## 2019-05-05 ASSESSMENT — COGNITIVE AND FUNCTIONAL STATUS - GENERAL
SUGGESTED CMS G CODE MODIFIER MOBILITY: CJ
SUGGESTED CMS G CODE MODIFIER DAILY ACTIVITY: CI
MOBILITY SCORE: 20
MOVING TO AND FROM BED TO CHAIR: A LITTLE
DAILY ACTIVITIY SCORE: 23
CLIMB 3 TO 5 STEPS WITH RAILING: A LITTLE
WALKING IN HOSPITAL ROOM: A LITTLE
STANDING UP FROM CHAIR USING ARMS: A LITTLE
HELP NEEDED FOR BATHING: A LITTLE

## 2019-05-05 ASSESSMENT — GAIT ASSESSMENTS
DEVIATION: BRADYKINETIC
DISTANCE (FEET): 0
GAIT LEVEL OF ASSIST: STAND BY ASSIST

## 2019-05-05 ASSESSMENT — ACTIVITIES OF DAILY LIVING (ADL): TOILETING: INDEPENDENT

## 2019-05-05 NOTE — DISCHARGE PLANNING
Received Choice form at 1125  Agency/Facility Name: [facility]  Outcome: Home Health order and F2F pending.

## 2019-05-05 NOTE — PROGRESS NOTES
Neurosurgery Progress Note    Subjective:  No acute events. Pt sleeping, rouses easily to voice. Just had pain medication, headache still there    Exam:  Drowsy. Oriented x3. Fluent Speech.   3 PERRL, EOMI. Denies blurry or double vision.   Tongue midline without fasciculation. No facial droop.   Ecchymosis around right eye  Hearing intact.   HANEY with f/s  Sensation: Intact throughout.   Eating, drinking. Denies n/v.  Pain controlled.   Ambulatory.     BP  Min: 125/86  Max: 147/93  Pulse  Av.4  Min: 70  Max: 99  Resp  Av.6  Min: 16  Max: 18  Temp  Av.1 °C (98.7 °F)  Min: 36.8 °C (98.2 °F)  Max: 37.4 °C (99.3 °F)  SpO2  Av.2 %  Min: 95 %  Max: 97 %    No Data Recorded    Recent Labs      19   WBC  14.2*  9.3  7.7   RBC  4.71  4.01*  4.20*   HEMOGLOBIN  14.3  12.5*  12.9*   HEMATOCRIT  43.2  37.2*  38.3*   MCV  91.7  92.8  91.2   MCH  30.4  31.2  30.7   MCHC  33.1*  33.6*  33.7   RDW  44.1  45.0  43.8   PLATELETCT  223  196  181   MPV  10.5  10.7  10.9     Recent Labs      19   SODIUM  138  136  140   POTASSIUM  4.4  3.8  3.8   CHLORIDE  105  109  108   CO2  19*  22  25   GLUCOSE  93  120*  108*   BUN  14  11  9   CREATININE  0.83  0.80  0.83   CALCIUM  8.4*  7.9*  8.2*     Recent Labs      19   APTT  24.5*   INR  1.06     Recent Labs      19   REACTMIN  4.2*   CLOTKINET  1.8   CLOTANGL  65.2   MAXCLOTS  69.5   [id number]  0.0   PRCINADP  28.8   PRCINAA  44.3       Intake/Output       19 - 19 0659 19 - 19 Total  Total       Intake    P.O.  1000  --   --  -- --    P.O.  -1000 -- -- --    Total Intake  -1000 -- -- --       Output    Urine  --  -- --  --  -- --    Number of Times Voided 3 x 3 x 6 x -- -- --    Total Output -- -- -- -- -- --       Net I/O      --  -- -- --             Intake/Output Summary (Last 24 hours) at 05/05/19 0904  Last data filed at 05/04/19 1700   Gross per 24 hour   Intake             1000 ml   Output                0 ml   Net             1000 ml            • Respiratory Care per Protocol   Continuous RT   • Pharmacy Consult Request  1 Each PHARMACY TO DOSE   • docusate sodium  100 mg BID   • senna-docusate  1 Tab Nightly   • senna-docusate  1 Tab Q24HRS PRN   • polyethylene glycol/lytes  1 Packet BID   • magnesium hydroxide  30 mL DAILY   • bisacodyl  10 mg Q24HRS PRN   • fleet  1 Each Once PRN   • acetaminophen  1,000 mg Q6HRS   • oxyCODONE immediate-release  5 mg Q3HRS PRN   • ondansetron  4 mg Q4HRS PRN   • levETIRAcetam  500 mg BID   • bacitracin-polymyxin b   TID       Assessment and Plan:  Hospital day #2  POD #na  Prophylactic anticoagulation: no         Start date/time: pt ambulatory    Plan:   Stable neuro exam  No NSG intervention indicated at this time  PT/OT-continue therapies  Neuro checks q4hrs  NO ASA or anticoagulants  Keppra x 7 days for seizure prophylaxis   Continue to monitor   Continue medical management   Rehab consult placed

## 2019-05-05 NOTE — DISCHARGE PLANNING
PMR referral from Dr. Rosen    TBI ongoing medical management as well as therapy need. Anticipate post acute services to facilitate a successful transition to community home with outpatient support. [place] provider, RR is not a benefit of the provider. Discharge planning notes indicate home in am with outpatient follow up. No physiatry consult ordered per protocol.

## 2019-05-05 NOTE — DISCHARGE PLANNING
note:  Met with wife Jana who is at the bedside and 3 other friends.   Explained the discharge plan to Jana. She is agreeable and PT was nice enough to talk to her today. Jana and friends are concerned about   pt having severe headache today and was unable to get oob when wife and friends were in room. However, bedside RN said that pt was ambulatory this morning. Explained that if pt still has severe headache tomorrow that [facility] will not dc pt and CM will follow up with [facility].   PT reviewed case and will be talk to wife.     Addendum:  Verified from Neftali at the [phone] that wife has a room at the [facility] and was able to check in.

## 2019-05-05 NOTE — THERAPY
"Physical Therapy Treatment completed.   Bed Mobility:  Supine to Sit: Modified Independent  Transfers: Sit to Stand: Supervised  Gait: Level Of Assist: Stand by Assist with No Equipment Needed       Plan of Care: Will benefit from Physical Therapy 4 times per week  Discharge Recommendations: Equipment: No Equipment Needed.     See \"Rehab Therapy-Acute\" Patient Summary Report for complete documentation.     Mr. Galarza demonstrates significant improvement since yesterday at initial evaluation. He was able to follow 3 step commands, withstand moderate manual perturbations in static standing with narrow base of support and during ambulation, transfer in and out of bed with head of bed flat and no bed rails and no assist. He ascended and descended 10 stairs 2 times and his wife was able to demonstrate safe assist. He demonstrated improved balance with vertical and horizontal head turns, performed stop and turn with no deviation, and stepped over a 12\" pseudogap with no deviation. He continues to have cognitive deficits as described by the occupational therapist, however from a physical standpoint appears at this time to be safe to return home should there be no change in function. He will continue to be followed by skilled acute physical therapy while in house and will benefit from home health physical therapy to continue to improve his balance and help him return to work.  "

## 2019-05-05 NOTE — THERAPY
"Occupational Therapy Evaluation completed.   Functional Status:  Pt seen for OT eval following a CHI. Pt is able to sequence and perform basic ADLs but demonstrates poor safety and decreased insight into his current deficits. Pt completed full body dressing, grooming, and standing tub transfer all with supervision. Despite education by multiple therapists, the pt still does not see the relationship between his deficits and his CHI. Pt's S.O. and friends were present during the evaluation. Recommend that the pt have 24/7 supervision at home and assist for all IADLs. Pt and his family were educated that the pt should not drive. The patient is eager to go home and his S.O. is in agreement to provide 24/7 supervision as well as assist for IADLs. Pt would benefit from ongoing therapy to address cognition and IADLs.   Plan of Care: Will benefit from Occupational Therapy 5 times per week  Discharge Recommendations:  Equipment: No Equipment Needed. Post-acute therapy: Recommend home health or outpatient transitional care services for continued occupational therapy to address cognition and IADLs    See \"Rehab Therapy-Acute\" Patient Summary Report for complete documentation.    "

## 2019-05-05 NOTE — DISCHARGE PLANNING
Received Choice form at [phone]   Agency/Facility Name: [facility]  Referral sent per Choice form @ [phone]

## 2019-05-05 NOTE — PROGRESS NOTES
0730Report received, plan of care reviewed and discussed, assessment complete, oriented to room, bed alarm on, nonskid socks applied, advised to call for assistance.   0930 Up to bathroom, complaints of headache, medication administered per MAR, will continue to monitor.  1130 Sleeping, complaints of headache, medication administered per MAR.  1330 Complaints of headache, medication administered per MAR, family and friends at bedside to discuss plan of care, NP at bedside, PT and OT present for education.  1530 Resting comfortably, call light within reach.  1730 Up in bed, no complaints.  1845 Report given to next shift.

## 2019-05-05 NOTE — PROGRESS NOTES
Trauma / Surgical Daily Progress Note    Date of Service  5/5/2019    Chief Complaint  48 y.o. male admitted 5/2/2019 with Trauma    Interval Events  No significant interval events  Complaints of headache  PT/SLP recommendations reviewed, OT pending    - Home health ordered, wife able to provide 24/7 supervision  - Plan for discharge in AM    Review of Systems  Review of Systems   Constitutional: Negative for chills and fever.   Eyes: Negative for blurred vision and double vision.   Respiratory: Negative for shortness of breath.    Cardiovascular: Negative for chest pain.   Gastrointestinal: Negative for abdominal pain, nausea and vomiting.   Genitourinary:        Voiding   Musculoskeletal: Positive for joint pain (mild pelivc pain). Negative for back pain, myalgias and neck pain.   Neurological: Negative for dizziness, sensory change and focal weakness.        Vital Signs  Temp:  [36.8 °C (98.2 °F)-37.4 °C (99.3 °F)] 36.8 °C (98.3 °F)  Pulse:  [70-99] 81  Resp:  [16-18] 18  BP: (125-147)/(84-95) 129/84  SpO2:  [94 %-97 %] 94 %    Physical Exam  Physical Exam   Constitutional: He is oriented to person, place, and time. He appears well-developed. No distress.   Eyes: Right conjunctiva has a hemorrhage (mild).   Right periorbital ecchymosis and edema   Neck: No JVD present. No tracheal deviation present.   Cardiovascular: Normal rate and intact distal pulses.    Pulmonary/Chest: Effort normal. No respiratory distress. He exhibits no tenderness.   Abdominal: Soft. He exhibits no distension. There is no tenderness. There is no guarding.   Musculoskeletal:   Moves all extremities   Neurological: He is alert and oriented to person, place, and time.   Skin: Skin is warm and dry.   Nursing note and vitals reviewed.      Laboratory  No results found for this or any previous visit (from the past 24 hour(s)).    Fluids    Intake/Output Summary (Last 24 hours) at 05/05/19 1147  Last data filed at 05/04/19 1700   Gross per 24  hour   Intake             1000 ml   Output                0 ml   Net             1000 ml       Core Measures & Quality Metrics  Labs reviewed, Medications reviewed and Radiology images reviewed  Fontaine catheter: No Fontaine      DVT Prophylaxis: Contraindicated - High bleeding risk  DVT prophylaxis - mechanical: SCDs          Total Score: 9    ETOH Screening  CAGE Score: 1  Assessment complete date: 5/3/2019        Assessment/Plan  Pubic ramus fracture (HCC)- (present on admission)   Assessment & Plan    Referring facility imaging notes a nondisplaced fracture at the junction of the right pubic ramus with the anterior column of the acetabulum and a nondisplaced fracture in the right sacral wing.  Non-operative management.  Weight bearing status - Weightbearing as tolerated BLE.  Follow up in 2 weeks  Lul Javier MD. Orthopedic Surgery.      Intracranial hemorrhage (HCC)- (present on admission)   Assessment & Plan    Referring facility CT head shows small multifocal areas of hemorrhage with a small cortical hemorrhage right frontal vertex and a 1cm deep white matter focus of hemorrhage int he right temporal region.   Repeat interval CT head with focal areas of hemorrhagic contusion in the right temporal and frontal lobes as described above, stable compared with the recent prior outside scan. Tiny acute extra-axial hemorrhage overlying the right frontal lobe, probably subdural, likely new.  Non-operative management.  Post traumatic pharmacologic seizure prophylaxis for 1 week.  Speech Language Pathology cognitive evaluation completed.  Renzo Gomez MD. Neurosurgery.      Contraindication to deep vein thrombosis (DVT) prophylaxis- (present on admission)   Assessment & Plan    Systemic anticoagulation contraindicated secondary to elevated bleeding risk.  Surveillance venous duplex scanning ordered for 5/4.      Skull fracture (HCC)- (present on admission)   Assessment & Plan    Bilateral skull fractures in the lamina  Propecia with overlying facial swelling and contusion.  Non-operative management.  Renzo Gomez MD. Neurosurgery.       Chin laceration- (present on admission)   Assessment & Plan    Sutured repair of right chin laceration at the referring facility.  Remove sutures in 5 days (5/7)      Trauma- (present on admission)   Assessment & Plan    Fall from 10ft ladder night prior to admission. Amnesic to event.  Trauma Yellow Transfer Activation from [facility].  Danis Hurd MD. Trauma Surgery.           Discussed patient condition with RN, Patient and trauma surgery. Dr. Hurd

## 2019-05-05 NOTE — DISCHARGE PLANNING
Anticipated Discharge Disposition:   Home with homehealth    Action:   Talked to MALINI about discharge planning.   Talked to wife Jana [phone], Jana has confirmed that she does not work and she is willing and available to provide 24/7 care and supervision for pt. Wife said that she is on her way to [place].   Talked to Emelia at [facility] [phone], she is requesting for a F2F for homehealth and all of the PT/ST notes to be faxed to her to [phone]. She will be able to set up homehealth by tomorrow. She agrees that pt should stay one more night for the wife to be trained by PT and then possible dc tomorrow.   MALINI Renteria is agreeable.   Choice sent to [facility]   Notified bedside RN.    Barriers to Discharge:   Trauma clearance  HomePT setup by [facility].     Plan:   Follow up with Emelia at [facility] tomorrow   Follow up with MALINI tomorrow.

## 2019-05-05 NOTE — PROGRESS NOTES
0730Report received, plan of care reviewed and discussed, assessment complete, oriented to room, bed alarm on, nonskid socks applied, advised to call for assistance.

## 2019-05-05 NOTE — FACE TO FACE
Face to Face Supporting Documentation - Home Health    The encounter with this patient was in whole or in part the primary reason for home health admission.    Date of encounter:   Patient:                    MRN:                       YOB: 2020  Td Galarza  [id number]  1970     Home health to see patient for:  Physical Therapy evaluation and treatment, Occupational therapy evaluation and treatment and Speech Language Pathology evaluation and treatment    Skilled need for:  Comment: Head trauma and pelvic fracture    Skilled nursing interventions to include:  Comment: PT/OT/SLP, ADLs    Homebound status evidenced by:  Needs the assistance of another person in order to leave the home. Leaving home requires a considerable and taxing effort. There is a normal inability to leave the home.    Community Physician to provide follow up care: No primary care provider on file.     Optional Interventions? No      I certify the face to face encounter for this home health care referral meets the CMS requirements and the encounter/clinical assessment with the patient was, in whole, or in part, for the medical condition(s) listed above, which is the primary reason for home health care. Based on my clinical findings: the service(s) are medically necessary, support the need for home health care, and the homebound criteria are met.  I certify that this patient has had a face to face encounter by the nurse practitioner working collaboratively with me.  NICK Baeza - NPI: [id number]

## 2019-05-06 VITALS
WEIGHT: 171.08 LBS | TEMPERATURE: 98.4 F | RESPIRATION RATE: 16 BRPM | OXYGEN SATURATION: 95 % | SYSTOLIC BLOOD PRESSURE: 118 MMHG | HEIGHT: 72 IN | DIASTOLIC BLOOD PRESSURE: 75 MMHG | HEART RATE: 68 BPM | BODY MASS INDEX: 23.17 KG/M2

## 2019-05-06 LAB
MAGNESIUM SERPL-MCNC: 1.7 MG/DL (ref 1.5–2.5)
PHOSPHATE SERPL-MCNC: 4.7 MG/DL (ref 2.5–4.5)

## 2019-05-06 PROCEDURE — 97530 THERAPEUTIC ACTIVITIES: CPT

## 2019-05-06 PROCEDURE — 700102 HCHG RX REV CODE 250 W/ 637 OVERRIDE(OP): Performed by: SURGERY

## 2019-05-06 PROCEDURE — 97116 GAIT TRAINING THERAPY: CPT

## 2019-05-06 PROCEDURE — 700111 HCHG RX REV CODE 636 W/ 250 OVERRIDE (IP): Performed by: NURSE PRACTITIONER

## 2019-05-06 PROCEDURE — 700101 HCHG RX REV CODE 250: Performed by: SURGERY

## 2019-05-06 PROCEDURE — A9270 NON-COVERED ITEM OR SERVICE: HCPCS | Performed by: NURSE PRACTITIONER

## 2019-05-06 PROCEDURE — A9270 NON-COVERED ITEM OR SERVICE: HCPCS | Performed by: SURGERY

## 2019-05-06 PROCEDURE — 700102 HCHG RX REV CODE 250 W/ 637 OVERRIDE(OP): Performed by: NURSE PRACTITIONER

## 2019-05-06 PROCEDURE — 83735 ASSAY OF MAGNESIUM: CPT

## 2019-05-06 PROCEDURE — 84100 ASSAY OF PHOSPHORUS: CPT

## 2019-05-06 PROCEDURE — 36415 COLL VENOUS BLD VENIPUNCTURE: CPT

## 2019-05-06 RX ORDER — ACETAMINOPHEN 325 MG/1
650 TABLET ORAL EVERY 6 HOURS PRN
COMMUNITY
Start: 2019-05-06

## 2019-05-06 RX ORDER — LEVETIRACETAM 500 MG/1
500 TABLET ORAL 2 TIMES DAILY
Qty: 6 TAB | Refills: 0 | Status: SHIPPED | OUTPATIENT
Start: 2019-05-06 | End: 2019-05-09

## 2019-05-06 RX ADMIN — LEVETIRACETAM 500 MG: 500 TABLET ORAL at 05:44

## 2019-05-06 RX ADMIN — Medication 1 EACH: at 05:45

## 2019-05-06 RX ADMIN — DEXAMETHASONE SODIUM PHOSPHATE 4 MG: 4 INJECTION, SOLUTION INTRA-ARTICULAR; INTRALESIONAL; INTRAMUSCULAR; INTRAVENOUS; SOFT TISSUE at 10:19

## 2019-05-06 RX ADMIN — ACETAMINOPHEN 650 MG: 325 TABLET, FILM COATED ORAL at 01:05

## 2019-05-06 RX ADMIN — DEXAMETHASONE SODIUM PHOSPHATE 4 MG: 4 INJECTION, SOLUTION INTRA-ARTICULAR; INTRALESIONAL; INTRAMUSCULAR; INTRAVENOUS; SOFT TISSUE at 01:04

## 2019-05-06 RX ADMIN — ACETAMINOPHEN 650 MG: 325 TABLET, FILM COATED ORAL at 05:45

## 2019-05-06 ASSESSMENT — ENCOUNTER SYMPTOMS
DOUBLE VISION: 0
FEVER: 0
DIZZINESS: 0
BLURRED VISION: 0
SHORTNESS OF BREATH: 0
FOCAL WEAKNESS: 0
VOMITING: 0
NECK PAIN: 0
MYALGIAS: 0
SENSORY CHANGE: 0
BACK PAIN: 0
CHILLS: 0
ABDOMINAL PAIN: 0
NAUSEA: 0

## 2019-05-06 ASSESSMENT — COGNITIVE AND FUNCTIONAL STATUS - GENERAL
SUGGESTED CMS G CODE MODIFIER MOBILITY: CI
MOBILITY SCORE: 23
CLIMB 3 TO 5 STEPS WITH RAILING: A LITTLE

## 2019-05-06 ASSESSMENT — GAIT ASSESSMENTS
DISTANCE (FEET): 1000
GAIT LEVEL OF ASSIST: MODIFIED INDEPENDENT

## 2019-05-06 NOTE — THERAPY
"Physical Therapy Treatment completed.   Bed Mobility:  Supine to Sit: Modified Independent  Transfers: Sit to Stand: Modified Independent  Gait: Level Of Assist: Modified Independent with No Equipment Needed       Plan of Care: Will benefit from Physical Therapy 4 times per week  Discharge Recommendations: Equipment: No Equipment Needed.     See \"Rehab Therapy-Acute\" Patient Summary Report for complete documentation.     Pt continues to progress well w/ therapy. Pt is able to perform all mobility at a [place] level. Pt was able to ambulate outside on a variety of surfaces w/ a steady gait pattern. He demonstrates improved endurance for mobility. Pt able to perform multiple flights of stairs and perform them w/out railing. SO present and able to verbalize and demonstrate gaurding although pt did not require it. As per previous tx, pt is at a level in which he can DC home w/ SO support.  "

## 2019-05-06 NOTE — PROGRESS NOTES
2010 Assumed care of pt. Pt A&Ox4. Pt denies numbness and tingling. Pain 5/10 to head, medicated per MAR. Bed alarm on. Pt educated to call for assistance when ambulating. Call light within reach. Bed locked and in lowest position. Treaded socks in place. SCDs refused at this time, education provided. Education provided about medication to prevent constipation.

## 2019-05-06 NOTE — PROGRESS NOTES
05/05/2019    Surgery patient?: no  Date of surgery: n/a  Ambulated 50 ft on day of surgery? (N/A if today is not date of surgery): n/a  Number of times ambulated 50 feet or greater today: 5  Patient has been up to chair, edge of bed or HOB 90 degrees for all meals?: 3/3  Goal met? (goal is ambulating at least 50 feet 2 times on day shift, one time on night shift): yes  If patient did not meet mobility goal, why?:

## 2019-05-06 NOTE — PROGRESS NOTES
Subjective:  HA gone, denies new symptoms.  Seen in conjunction with trauma- dc home today    Obj:  Observed ambulating halls. Ecchymosis to eyes  AAOx4, tongue mL. NO drift  FCx4        Temp (24hrs), Av.9 °C (98.5 °F), Min:36.3 °C (97.3 °F), Max:37.4 °C (99.3 °F)    Pulse: 68, Respiration: 16, Blood Pressure: 118/75, Weight: 77.6 kg (171 lb 1.2 oz), Pulse Oximetry: 95 %, O2 (LPM): 0          No intake or output data in the 24 hours ending 19 1024         • acetaminophen  650 mg     • acetaminophen/caffeine/butalbital 325-40-50 mg  1 Tab     • Respiratory Care per Protocol       • Pharmacy Consult Request  1 Each     • docusate sodium  100 mg     • senna-docusate  1 Tab     • senna-docusate  1 Tab     • polyethylene glycol/lytes  1 Packet     • magnesium hydroxide  30 mL     • bisacodyl  10 mg     • fleet  1 Each     • oxyCODONE immediate-release  5 mg     • ondansetron  4 mg     • levETIRAcetam  500 mg     • bacitracin-polymyxin b           Recent Labs      19   WBC  7.7   RBC  4.20*   HEMOGLOBIN  12.9*   HEMATOCRIT  38.3*   MCV  91.2   MCH  30.7   PLATELETCT  181     Recent Labs      19   022   SODIUM  140   POTASSIUM  3.8   CHLORIDE  108   CO2  25   GLUCOSE  108*   BUN  9   CREATININE  0.83   CALCIUM  8.2*           Assessment:  Hospital day #5  POD #na  Prophylactic anticoagulation: no         Start date/time: pt ambulatory     Plan:   HH following  NO ASA or anticoagulants  Keppra x 7 days for seizure prophylaxis   Follow up with [facility] in [place] area in two weeks or sooner for new symptoms

## 2019-05-06 NOTE — PROGRESS NOTES
Report received from night shift RN, care assumed. Pt is sleeping at this time, breathing unlabored, body relaxed. No signs of distress at this time.Bed locked in lowest position. Call light and personal belongings within reach.

## 2019-05-06 NOTE — DISCHARGE SUMMARY
Trauma Discharge Summary    DATE OF ADMISSION: 5/2/2019    DATE OF DISCHARGE: 5/6/2019    LENGTH OF STAY: 4 days    ATTENDING PHYSICIAN: Danis Hurd M.D. Trauma surgery    CONSULTING PHYSICIAN:   1. Renzo Gomez MD. Neurosurgery  2. Lul Javier MD. Orthopedic Surgery    DISCHARGE DIAGNOSIS:  1. Intracranial hemorrhage  2. Pubic ramus fracture  3. Skull fracture  4. Contraindication to DVT prophylaxis  5. Platelet dysfunction due to aspirin  6. Trauma  7. Chin laceration  8. Ankle pain, left    PROCEDURES:  None    HISTORY OF PRESENT ILLNESS: The patient is a 48 y.o. male involved in a fall from a ladder. He was transferred to [facility] where he was noted to have scattered frontal intraparenchymal hemorrhages and a nondisplaced right superior ramus and acetabular fracture. He was subsequently transferred to [facility] for definite trauma care. He was triaged as a Trauma yellow transfer in accordance with established pre-hospital protocols.    HOSPITAL COURSE: On arrival, outside imaging was reviewed underwent additional radiographic and laboratory studies and was admitted to the critical care team under the direction and supervision of Dr. Hurd. He sustained the listed injuries and incurred the listed diagnosis during his stay.    He was transferred from the emergency department to the trauma intensive care unit where a tertiary exam was performed.     Again he was noted to have sustained small multifocal areas of hemorrhage with a small cortical hemorrhage right frontal vertex and a 1cm deep white matter focus of hemorrhage in the right temporal region. Dr. Gomez was consulted for neurosurgery. He was treated non-operatively. He underwent a repeat head CT that showed focal areas of hemorrhagic contusion in the right temporal and frontal lobes as described above, stable compared with the recent prior outside scan and tiny acute extra-axial hemorrhage overlying the right  frontal lobe, probably subdural, likely new. He was placed on post-traumatic pharmacologic seizure prophylaxis for one week. He was also evaluated by speech therapy who recommended supervision upon discharge home.     He was also noted to have bilateral skull fractures in the lamina propecia with overlying facial swelling and contusion. This was managed non-operatively.     Referring facility imaging noted a nondisplaced fracture at the junction of the right pubic ramus with the anterior column of the acetabulum and a nondisplaced fracture in the right sacral wing. Dr. Javier was consulted and he was treated nonoperatively and made weight bearing as tolerated to bilateral lower extremities and is to follow-up in two weeks.     A right chin laceration was approximated with suture at the referring facility and can be removed on 5/7/19.    The patient complained of tenderness over the left medial malleolus. Diagnostic imaging was negative for acute injury and he was mobilized as tolerated.     He was medically stable for transfer to the [facility]surgical [facility] on 5/3/19. He was seen by physical and occupational therapy who recommend home health with 24/7 supervision. [place] [facility] has arranged home health and the wife is able to provide 24/7 supervision.     On the day of discharge he is up ambulating independently. His neurologic exam is stable without headache. He is tolerating room air with O2 saturations greater than 92%. He is tolerating a regular diet, voiding and having bowel movements. He is very eager and feeling well enough for discharge home.     DISCHARGE PHYSICAL EXAM: See epic physical exam dated 5/6/2019    DISCHARGE MEDICATIONS:  I reviewed the patients controlled substance history and obtained a controlled substance use informed consent (if applicable) provided by [facility] and the patient has been prescribed.       Medication List      START taking these medications      Instructions    acetaminophen 325 MG Tabs  Commonly known as:  TYLENOL   Take 2 Tabs by mouth every 6 hours as needed.  Dose:  650 mg     levETIRAcetam 500 MG Tabs  Commonly known as:  KEPPRA   Take 1 Tab by mouth 2 Times a Day for 3 days.  Dose:  500 mg        STOP taking these medications    aspirin EC 81 MG Tbec  Commonly known as:  ECOTRIN            DISPOSITION: The patient will be discharged home in stable condition on 5/6/19. He will follow up with Dr. Gomez and Dr. Javier or local neurosurgery/orthopedic surgery in 2 weeks. He will follow up with Dr. Hurd as needed. Sutures are again, to be removed 5/7/19.    The patient and family have been extensively counseled and all questions have been answered. Special attention was paid to respiratory decompensation, uncontrolled pain and signs and symptoms of infection and to seek immediate medical attention if these develop. The patient demonstrates understanding and gives verbal compliance with discharge instructions.    TIME SPENT ON DISCHARGE: 45 minutes      ____________________________________________  NICK Baeza    DD: 5/6/2019 10:42 AM

## 2019-05-06 NOTE — DISCHARGE INSTRUCTIONS
-Discharge Instructions  - Call or seek medical attention for questions or concerns   - Follow up with Dr. Gomez or local neurosurgery in 1 weeks time   - Follow up with Dr. Javier or local orthopedic surgery in 1-2 weeks   - Follow up with Dr. Hurd as needed   - Avoid all blood thinners including aspirin or NSAIDs (ibuprophen, Advil, Aleve, Motrin) for at least two weeks   - Follow up with primary care provider within one weeks time and for home health, ongoing PT/OT/SLP   - Suture removal 5/7-5/9   - Resume regular diet   - May take over the counter acetaminophen as needed for pain   - Continue daily over the counter stool softener while on narcotics   - No operation of machinery or motorized vehicles while under the influence of narcotics   - No alcohol, marijuana or illicit drug use while under the influence of narcotics   - No swimming, hot tubs, baths or wound submersion until cleared by outpatient provider. May shower   - No contact sports, strenuous activities, or heavy lifting until cleared by outpatient provider   - If respiratory decompensation, uncontrolled pain, or signs or symptoms of infection occur seek medical attention      Discharged to home by car with relative. Discharged via wheelchair, hospital escort: Yes.  Special equipment needed: Not Applicable    Be sure to schedule a follow-up appointment with your primary care doctor or any specialists as instructed.     Discharge Plan:   Smoking Cessation Offered: Patient Refused  Influenza Vaccine Indication: Not indicated: Previously immunized this influenza season and > 8 years of age    I understand that a diet low in cholesterol, fat, and sodium is recommended for good health. Unless I have been given specific instructions below for another diet, I accept this instruction as my diet prescription.   Other diet: regular    Special Instructions: None    · Is patient discharged on Warfarin / Coumadin?   No     Depression / Suicide Risk    As  you are discharged from this [facility] facility, it is important to learn how to keep safe from harming yourself.    Recognize the warning signs:  · Abrupt changes in personality, positive or negative- including increase in energy   · Giving away possessions  · Change in eating patterns- significant weight changes-  positive or negative  · Change in sleeping patterns- unable to sleep or sleeping all the time   · Unwillingness or inability to communicate  · Depression  · Unusual sadness, discouragement and loneliness  · Talk of wanting to die  · Neglect of personal appearance   · Rebelliousness- reckless behavior  · Withdrawal from people/activities they love  · Confusion- inability to concentrate     If you or a loved one observes any of these behaviors or has concerns about self-harm, here's what you can do:  · Talk about it- your feelings and reasons for harming yourself  · Remove any means that you might use to hurt yourself (examples: pills, rope, extension cords, firearm)  · Get professional help from the community (Mental Health, Substance Abuse, psychological counseling)  · Do not be alone:Call your Safe Contact- someone whom you trust who will be there for you.  · Call your local CRISIS HOTLINE 936-8065 or 380-285-7266  · Call your local Children's Mobile Crisis Response Team [place] (871) 067-0421 or www.[employer]  · Call the toll free National Suicide Prevention Hotlines   · National Suicide Prevention Lifeline 580-586-FXVC (1855)  · National Hope Line Network 800-SUICIDE (569-9947)

## 2019-05-06 NOTE — PROGRESS NOTES
Trauma / Surgical Daily Progress Note    Date of Service  5/6/2019    Chief Complaint  48 y.o. male admitted 5/2/2019 with Trauma    Interval Events  No complaints of headache this morning, neuro exam unchanged  PT/OT recommendations reviewed  Wife able to provide 24/7 supervision upon DC  SW confirming home health arrangements  Medically cleared for DC once HH arranged    Review of Systems  Review of Systems   Constitutional: Negative for chills and fever.   Eyes: Negative for blurred vision and double vision.   Respiratory: Negative for shortness of breath.    Cardiovascular: Negative for chest pain.   Gastrointestinal: Negative for abdominal pain, nausea and vomiting.   Genitourinary:        Voiding   Musculoskeletal: Positive for joint pain (mild pelivc pain). Negative for back pain, myalgias and neck pain.   Neurological: Negative for dizziness, sensory change and focal weakness.        Vital Signs  Temp:  [36.3 °C (97.3 °F)-37.4 °C (99.3 °F)] 36.9 °C (98.4 °F)  Pulse:  [63-79] 68  Resp:  [16-18] 16  BP: (118-144)/(73-87) 118/75  SpO2:  [92 %-96 %] 95 %    Physical Exam  Physical Exam   Constitutional: He is oriented to person, place, and time. He appears well-developed. No distress.   Eyes: Right conjunctiva has a hemorrhage (mild).   Right periorbital ecchymosis and edema   Neck: No JVD present. No tracheal deviation present.   Cardiovascular: Normal rate and intact distal pulses.    Pulmonary/Chest: Effort normal. No respiratory distress. He exhibits no tenderness.   Abdominal: Soft. He exhibits no distension. There is no tenderness. There is no guarding.   Musculoskeletal:   Moves all extremities   Neurological: He is alert and oriented to person, place, and time.   Skin: Skin is warm and dry.   Nursing note and vitals reviewed.      Laboratory  Recent Results (from the past 24 hour(s))   Magnesium: Every Monday and Thursday AM    Collection Time: 05/06/19  3:08 AM   Result Value Ref Range    Magnesium 1.7  1.5 - 2.5 mg/dL   Phosphorus: Every Monday and Thursday AM    Collection Time: 05/06/19  3:08 AM   Result Value Ref Range    Phosphorus 4.7 (H) 2.5 - 4.5 mg/dL       Fluids  No intake or output data in the 24 hours ending 05/06/19 0916    Core Measures & Quality Metrics  Labs reviewed, Medications reviewed and Radiology images reviewed  Fontaine catheter: No Fontaine      DVT Prophylaxis: Contraindicated - High bleeding risk  DVT prophylaxis - mechanical: SCDs          Total Score: 9    ETOH Screening  CAGE Score: 1  Assessment complete date: 5/3/2019        Assessment/Plan  Pubic ramus fracture (HCC)- (present on admission)   Assessment & Plan    Referring facility imaging notes a nondisplaced fracture at the junction of the right pubic ramus with the anterior column of the acetabulum and a nondisplaced fracture in the right sacral wing.  Non-operative management.  Weight bearing status - Weightbearing as tolerated BLE.  Follow up in 2 weeks  Lul Javier MD. Orthopedic Surgery.       Intracranial hemorrhage (HCC)- (present on admission)   Assessment & Plan    Referring facility CT head shows small multifocal areas of hemorrhage with a small cortical hemorrhage right frontal vertex and a 1cm deep white matter focus of hemorrhage int he right temporal region.   Repeat interval CT head with focal areas of hemorrhagic contusion in the right temporal and frontal lobes as described above, stable compared with the recent prior outside scan. Tiny acute extra-axial hemorrhage overlying the right frontal lobe, probably subdural, likely new.  Non-operative management.  Post traumatic pharmacologic seizure prophylaxis for 1 week.  Speech Language Pathology cognitive evaluation completed.  Renzo Gomez MD. Neurosurgery.       Contraindication to deep vein thrombosis (DVT) prophylaxis- (present on admission)   Assessment & Plan    Systemic anticoagulation contraindicated secondary to elevated bleeding risk.  Surveillance venous  duplex scanning negative for DVT.       Skull fracture (HCC)- (present on admission)   Assessment & Plan    Bilateral skull fractures in the lamina Propecia with overlying facial swelling and contusion.  Non-operative management.  Renzo Gomez MD. Neurosurgery.        Chin laceration- (present on admission)   Assessment & Plan    Sutured repair of right chin laceration at the referring facility.  Remove sutures in 5 days (5/7)       Trauma- (present on admission)   Assessment & Plan    Fall from 10ft ladder night prior to admission. Amnesic to event.  Trauma Yellow Transfer Activation from [facility].  Danis Hurd MD. Trauma Surgery.            Discussed patient condition with RN, Patient and trauma surgery. Dr. Hurd

## 2019-05-06 NOTE — DISCHARGE PLANNING
Anticipated Discharge Disposition:   Home with homehealth    Action:   Called Emelia at [facility] who said that homehealth is still pending.   DELORIS Loredo will call back    Observed pt is able to ambulate independently.   Wife is with pt and they are ready to go home.  Second message left for Emelia.     Gertrude SUGGS CM from [facility] has approved and set up homehealth auth # [id number].  [facility] homehealth will call pt 24-48 hrs after discharge.   Pt aware.     Barriers to Discharge:   Pending hh set up    Plan:   Follow up with [facility]  Update RN and pt.

## 2019-05-06 NOTE — CARE PLAN
Problem: Communication  Goal: The ability to communicate needs accurately and effectively will improve  Outcome: PROGRESSING AS EXPECTED  Patient able to communicate needs to staff. Call light within reach, patient educated to call for assistance. Will continue to assess.    Problem: Safety  Goal: Will remain free from injury  Outcome: PROGRESSING AS EXPECTED  Patient educated to call for assistance. Precautions in place; Call light within reach. Bed alarm in use.  Bed locked and in lowest position. Treaded socks in place. Hourly rounding. Will continue to monitor.